# Patient Record
Sex: FEMALE | Race: OTHER | Employment: UNEMPLOYED | URBAN - METROPOLITAN AREA
[De-identification: names, ages, dates, MRNs, and addresses within clinical notes are randomized per-mention and may not be internally consistent; named-entity substitution may affect disease eponyms.]

---

## 2017-01-09 ENCOUNTER — APPOINTMENT (OUTPATIENT)
Dept: CT IMAGING | Age: 47
End: 2017-01-09
Payer: SELF-PAY

## 2017-01-09 ENCOUNTER — HOSPITAL ENCOUNTER (EMERGENCY)
Age: 47
Discharge: HOME | End: 2017-01-10
Attending: EMERGENCY MEDICINE
Payer: SELF-PAY

## 2017-01-09 DIAGNOSIS — D50.9 IRON DEFICIENCY ANEMIA, UNSPECIFIED IRON DEFICIENCY ANEMIA TYPE: ICD-10-CM

## 2017-01-09 DIAGNOSIS — I10 ESSENTIAL HYPERTENSION: ICD-10-CM

## 2017-01-09 DIAGNOSIS — N39.0 URINARY TRACT INFECTION WITHOUT HEMATURIA, SITE UNSPECIFIED: Primary | ICD-10-CM

## 2017-01-09 LAB
BASOPHILS # BLD AUTO: 0.1 K/UL (ref 0–0.2)
BASOPHILS # BLD: 1 % (ref 0–2)
DIFFERENTIAL METHOD BLD: ABNORMAL
EOSINOPHIL # BLD: 0.3 K/UL (ref 0–0.8)
EOSINOPHIL NFR BLD: 3 % (ref 0.5–7.8)
ERYTHROCYTE [DISTWIDTH] IN BLOOD BY AUTOMATED COUNT: 15 % (ref 11.9–14.6)
HCG UR QL: NEGATIVE
HCT VFR BLD AUTO: 36.5 % (ref 35.8–46.3)
HGB BLD-MCNC: 10.9 G/DL (ref 11.7–15.4)
IMM GRANULOCYTES # BLD: 0 K/UL (ref 0–0.5)
IMM GRANULOCYTES NFR BLD AUTO: 0.2 % (ref 0–5)
LYMPHOCYTES # BLD AUTO: 33 % (ref 13–44)
LYMPHOCYTES # BLD: 3.2 K/UL (ref 0.5–4.6)
MCH RBC QN AUTO: 23.7 PG (ref 26.1–32.9)
MCHC RBC AUTO-ENTMCNC: 29.9 G/DL (ref 31.4–35)
MCV RBC AUTO: 79.3 FL (ref 79.6–97.8)
MONOCYTES # BLD: 0.7 K/UL (ref 0.1–1.3)
MONOCYTES NFR BLD AUTO: 8 % (ref 4–12)
NEUTS SEG # BLD: 5.4 K/UL (ref 1.7–8.2)
NEUTS SEG NFR BLD AUTO: 55 % (ref 43–78)
PLATELET # BLD AUTO: 359 K/UL (ref 150–450)
PMV BLD AUTO: 11.2 FL (ref 10.8–14.1)
RBC # BLD AUTO: 4.6 M/UL (ref 4.05–5.25)
WBC # BLD AUTO: 9.8 K/UL (ref 4.3–11.1)

## 2017-01-09 PROCEDURE — 70450 CT HEAD/BRAIN W/O DYE: CPT

## 2017-01-09 PROCEDURE — 84443 ASSAY THYROID STIM HORMONE: CPT | Performed by: PHYSICIAN ASSISTANT

## 2017-01-09 PROCEDURE — 81015 MICROSCOPIC EXAM OF URINE: CPT | Performed by: PHYSICIAN ASSISTANT

## 2017-01-09 PROCEDURE — 85025 COMPLETE CBC W/AUTO DIFF WBC: CPT | Performed by: PHYSICIAN ASSISTANT

## 2017-01-09 PROCEDURE — 74011250636 HC RX REV CODE- 250/636: Performed by: PHYSICIAN ASSISTANT

## 2017-01-09 PROCEDURE — 80053 COMPREHEN METABOLIC PANEL: CPT | Performed by: PHYSICIAN ASSISTANT

## 2017-01-09 PROCEDURE — 93005 ELECTROCARDIOGRAM TRACING: CPT | Performed by: PHYSICIAN ASSISTANT

## 2017-01-09 PROCEDURE — 96361 HYDRATE IV INFUSION ADD-ON: CPT | Performed by: PHYSICIAN ASSISTANT

## 2017-01-09 PROCEDURE — 81003 URINALYSIS AUTO W/O SCOPE: CPT | Performed by: PHYSICIAN ASSISTANT

## 2017-01-09 PROCEDURE — 81025 URINE PREGNANCY TEST: CPT

## 2017-01-09 PROCEDURE — 99284 EMERGENCY DEPT VISIT MOD MDM: CPT | Performed by: PHYSICIAN ASSISTANT

## 2017-01-09 PROCEDURE — 96374 THER/PROPH/DIAG INJ IV PUSH: CPT | Performed by: PHYSICIAN ASSISTANT

## 2017-01-09 RX ADMIN — SODIUM CHLORIDE 1000 ML: 900 INJECTION, SOLUTION INTRAVENOUS at 23:25

## 2017-01-10 ENCOUNTER — APPOINTMENT (OUTPATIENT)
Dept: CT IMAGING | Age: 47
End: 2017-01-10
Payer: SELF-PAY

## 2017-01-10 VITALS
SYSTOLIC BLOOD PRESSURE: 125 MMHG | TEMPERATURE: 98.5 F | RESPIRATION RATE: 16 BRPM | OXYGEN SATURATION: 100 % | HEART RATE: 85 BPM | HEIGHT: 65 IN | WEIGHT: 170 LBS | DIASTOLIC BLOOD PRESSURE: 86 MMHG | BODY MASS INDEX: 28.32 KG/M2

## 2017-01-10 LAB
ALBUMIN SERPL BCP-MCNC: 3.8 G/DL (ref 3.5–5)
ALBUMIN/GLOB SERPL: 0.9 {RATIO} (ref 1.2–3.5)
ALP SERPL-CCNC: 81 U/L (ref 50–136)
ALT SERPL-CCNC: 17 U/L (ref 12–65)
ANION GAP BLD CALC-SCNC: 6 MMOL/L (ref 7–16)
AST SERPL W P-5'-P-CCNC: 12 U/L (ref 15–37)
ATRIAL RATE: 78 BPM
BACTERIA URNS QL MICRO: ABNORMAL /HPF
BILIRUB SERPL-MCNC: 0.3 MG/DL (ref 0.2–1.1)
BUN SERPL-MCNC: 12 MG/DL (ref 6–23)
CALCIUM SERPL-MCNC: 8.3 MG/DL (ref 8.3–10.4)
CALCULATED P AXIS, ECG09: 73 DEGREES
CALCULATED R AXIS, ECG10: 63 DEGREES
CALCULATED T AXIS, ECG11: 60 DEGREES
CASTS URNS QL MICRO: 0 /LPF
CHLORIDE SERPL-SCNC: 105 MMOL/L (ref 98–107)
CO2 SERPL-SCNC: 29 MMOL/L (ref 21–32)
CREAT SERPL-MCNC: 0.86 MG/DL (ref 0.6–1)
CRYSTALS URNS QL MICRO: ABNORMAL /LPF
DIAGNOSIS, 93000: NORMAL
DIASTOLIC BP, ECG02: NORMAL MMHG
EPI CELLS #/AREA URNS HPF: ABNORMAL /HPF
FLUAV AG NPH QL IA: NEGATIVE
FLUBV AG NPH QL IA: NEGATIVE
GLOBULIN SER CALC-MCNC: 4.2 G/DL (ref 2.3–3.5)
GLUCOSE SERPL-MCNC: 126 MG/DL (ref 65–100)
MUCOUS THREADS URNS QL MICRO: ABNORMAL /LPF
P-R INTERVAL, ECG05: 156 MS
POTASSIUM SERPL-SCNC: 3.8 MMOL/L (ref 3.5–5.1)
PROT SERPL-MCNC: 8 G/DL (ref 6.3–8.2)
Q-T INTERVAL, ECG07: 384 MS
QRS DURATION, ECG06: 76 MS
QTC CALCULATION (BEZET), ECG08: 437 MS
RBC #/AREA URNS HPF: 0 /HPF
SODIUM SERPL-SCNC: 140 MMOL/L (ref 136–145)
SYSTOLIC BP, ECG01: NORMAL MMHG
TSH SERPL DL<=0.005 MIU/L-ACNC: 1.29 UIU/ML (ref 0.36–3.74)
VENTRICULAR RATE, ECG03: 78 BPM
WBC URNS QL MICRO: ABNORMAL /HPF

## 2017-01-10 PROCEDURE — 70496 CT ANGIOGRAPHY HEAD: CPT

## 2017-01-10 PROCEDURE — 74011636320 HC RX REV CODE- 636/320: Performed by: EMERGENCY MEDICINE

## 2017-01-10 PROCEDURE — 74011000258 HC RX REV CODE- 258: Performed by: EMERGENCY MEDICINE

## 2017-01-10 PROCEDURE — 87804 INFLUENZA ASSAY W/OPTIC: CPT | Performed by: PHYSICIAN ASSISTANT

## 2017-01-10 PROCEDURE — 74011250636 HC RX REV CODE- 250/636: Performed by: PHYSICIAN ASSISTANT

## 2017-01-10 RX ORDER — NITROFURANTOIN 25; 75 MG/1; MG/1
100 CAPSULE ORAL 2 TIMES DAILY
Qty: 10 CAP | Refills: 0 | Status: SHIPPED | OUTPATIENT
Start: 2017-01-10 | End: 2017-01-15

## 2017-01-10 RX ORDER — SODIUM CHLORIDE 0.9 % (FLUSH) 0.9 %
10 SYRINGE (ML) INJECTION
Status: COMPLETED | OUTPATIENT
Start: 2017-01-10 | End: 2017-01-10

## 2017-01-10 RX ORDER — LORAZEPAM 2 MG/ML
1 INJECTION INTRAMUSCULAR
Status: COMPLETED | OUTPATIENT
Start: 2017-01-10 | End: 2017-01-10

## 2017-01-10 RX ADMIN — LORAZEPAM 1 MG: 2 INJECTION, SOLUTION INTRAMUSCULAR; INTRAVENOUS at 00:02

## 2017-01-10 RX ADMIN — Medication 10 ML: at 02:41

## 2017-01-10 RX ADMIN — SODIUM CHLORIDE 100 ML: 900 INJECTION, SOLUTION INTRAVENOUS at 02:41

## 2017-01-10 RX ADMIN — IOPAMIDOL 100 ML: 755 INJECTION, SOLUTION INTRAVENOUS at 02:41

## 2017-01-10 NOTE — DISCHARGE INSTRUCTIONS
You are slightly anemic and this will need to be followed up with your primary care provider at the [facility]. If you should have any change in your symptoms, worsening symptoms, or concerns return to the ER    It is very important that you follow up with your Doctor at the [facility] to be treated for your hypertension. Anemia por deficiencia de neelam: Instrucciones de cuidado - [ Iron Deficiency Anemia: Care Instructions ]  Instrucciones de cuidado    Tener anemia significa que usted no tiene suficientes glóbulos rojos. Los glóbulos rojos transportan oxígeno por el cuerpo. Si tiene anemia, esta puede hacer que se fatmata pálido y que esté débil y cansado. Muchas cosas pueden causar anemia. La causa más común es la pérdida de [place]. [place] puede ocurrir si tiene períodos [employer]. También puede suceder si tiene algún sangrado (hemorragia) en el estómago o los intestinos. También puede tener anemia si no obtiene suficiente neelam en lee dieta o si lee cuerpo tiene dificultades para absorber el neelam. En algunos casos, el embarazo produce anemia. [place] es porque charis carlito embarazada necesita más neelam. Lee médico aliyah vez le lily más pruebas para encontrar la causa de lee anemia. Si charis enfermedad u otro problema de [facility] causándola, lee médico tratará sagar problema. Es importante que siga viendo a lee médico para asegurarse de que lee nivel de neelam vuelva a la normalidad. La atención de seguimiento es charis parte clave de lee tratamiento y seguridad. Asegúrese de hacer y acudir a todas las citas, y llame a lee médico si está teniendo problemas. También es charis buena idea saber los resultados de los exámenes y mantener charis lista de los medicamentos que eulalia. ¿Cómo puede cuidarse en el hogar? · Si lee médico le recetó pastillas de neelam, tómelas según las indicaciones. ¨ Trate de tomarlas con el estómago vacío.  Puede hacer esto aproximadamente 1 hora antes de comer o 2 horas después de comer. Sasha podría necesitar lucero el neelam con la comida para evitar el malestar estomacal.  ¨ No tome antiácidos, leche ni nada con cafeína dentro de las 2 horas de lucero lee neelam. Esos productos pueden impedir que el cuerpo absorba jen el neelam. ¨ La vitamina C ayuda a lee organismo a absorber el neelam. Sería conveniente lucero las pastillas de neelam con un vaso de jugo de naranja o con otro tipo de alimento rico en vitamina C.  ¨ Las pastillas de neelam podrían causarle problemas estomacales. Estos incluyen [employer]V, náuseas, diarrea, estreñimiento y retortijones. Puede ayudarle lucero bastante cantidad de líquidos e incluir frutas, verduras y [place] en lee alimentación. ¨ Es normal que las pastilla de neelam urvashi que shivani heces sruthi de color verdoso o grisáceo negruzco. Sasha el sangrado interno también puede producir heces oscuras. De modo que es importante que le avise a lee médico acerca de cualquier cambio de color. ¨ Llame a lee médico si desiree estar teniendo problemas con las pastillas de neelam. Incluso después de que empiece a sentirse mejor, lee cuerpo tardará varios meses en acumular Mariam Ruse de neelam. ¨ Si olvida lucero [employer], no tome charis dosis doble la siguiente vez. ¨ Mantenga las pastillas de neelam fuera del alcance de los niños pequeños. Demasiado neelam puede ser [employer]. · Coma alimentos con mucho neelam. Estos incluyen ana joyce, mariscos, aves y [place]. Revonda Elder frijoles (habichuelas), uvas pasas, pan de grano integral y verduras de [employer]. · Prepare las verduras al vapor. Esta es la mejor manera de prepararlas si quiere obtener la mayor cantidad de neelam posible. · Sea christo con los medicamentos. No tome analgésicos (medicamentos para el dolor) antiinflamatorios no esteroideos a menos que el médico se lo indique. Estos incluyen aspirina, naproxeno (Aleve) e ibuprofeno (Advil, Motrin).   · Las formas líquidas de neelam pueden manchar tana dientes. Sasha usted puede mezclarlo con agua o jugo y beberlo con charis pajita (popote). [employer] modo, no se adherirá a tana dientes. ¿Cuándo debe pedir ayuda? Llame al 911 en cualquier momento que considere que necesita atención de [place]. Por ejemplo, llame si:  · Se desmayó (perdió el conocimiento). · Vomita lola o algo parecido a granos de café molido. · Las heces son de color rojizo o muy sanguinolentas (con lola). Llame a lee médico ahora mismo o busque atención médica inmediata si:  · Tana heces son negruzcas y parecidas al alquitrán o tienen rastros de [place]. · Siente mareos o aturdimiento, o que está a punto de [place]. Preste especial atención a los cambios en lee jelly y asegúrese de comunicarse con lee médico si:  · La fatiga y la debilidad continúan o [place]. · Tiene efectos secundarios después de lucero pastillas de neelam, negrito náuseas, vómito, estreñimiento, diarrea o acidez estomacal.  · No mejora negrito se esperaba. ¿Dónde puede encontrar más información en inglés? Cassfabio Rankin a http://yasmin-carly.info/. Cata Marlow O035 en la búsqueda para aprender más acerca de \"Anemia por deficiencia de neelam: Instrucciones de cuidado - [ Iron Deficiency Anemia: Care Instructions ]. \"  Revisado: 5 febrero, 2016  Versión del contenido: 11.1  © 6254-2082 [employer], [employer]. Las instrucciones de cuidado fueron adaptadas bajo licencia por Good Help Connections (which disclaims liability or warranty for this information). Si usted tiene [place] afección médica o sobre estas instrucciones, siempre pregunte a lee profesional de jelly. [facility] niega toda garantía o responsabilidad por lee uso de esta información. Presión arterial andrés: Instrucciones de cuidado - [ High Blood Pressure: Care Instructions ]  Instrucciones de cuidado  Si lee presión arterial suele ser superior a 140/90, usted tiene presión arterial andrés o hipertensión.  [place] significa que el número de [place] es 140 o superior o que el número de abajo es 90 o superior, o ambas cosas. A pesar de lo que mucha gente desiree, la hipertensión no suele causar dolor de michelle ni provocar mareos o aturdimiento. Generalmente, no presenta síntomas. Sin embargo, aumenta el riesgo de ataque al corazón, ataque cerebral, y daños en los riñones o en los ojos. A mayor presión arterial, mayor riesgo. Lee médico le dará charis meta para lee presión arterial. Lee meta se basará en lee jelly y lee edad. Un ejemplo de meta es mantener lee presión arterial por debajo de 140/90. Los cambios de estilo de davonte, negrito alimentarse en forma saludable y [place], siempre son importantes para ayudarle a bajar la presión arterial. También podría lucero medicamentos para lograr lee meta para la presión arterial.  La atención de seguimiento es charis parte clave de lee tratamiento y seguridad. Asegúrese de hacer y acudir a todas las citas, y llame a lee médico si está teniendo problemas. También es charis buena idea saber los resultados de shivani exámenes y mantener charis lista de los medicamentos que eulalia. ¿Cómo puede cuidarse en el hogar? Tratamiento médico  · Si fatoumata de lucero shivani medicamentos, lee presión arterial volverá a subir. Es posible que deba lucero un tipo de [place], o más de [place], para reducir la presión arterial. Sea christo con los medicamentos. [place] lee medicamento exactamente negrito se lo hayan indicado. Llame a lee médico si desiree estar teniendo problemas con lee medicamento. · Hable con lee médico antes de empezar a lucero [facility]. La aspirina puede ayudar a determinadas personas a reducir lee riesgo de tener un ataque cardíaco o un ataque cerebral. Sasha lucero aspirina no es adecuado para todo el TRENGEREID, debido a que puede causar sangrado grave. · Visite a lee médico periódicamente.  Usted podría necesitar consultar a lee médico con más frecuencia al principio o hasta que se reduzca lee presión arterial.  · Si usted está tomando medicamentos para la presión arterial, hable con lee médico antes de lucero medicamentos descongestionantes o antiinflamatorios, negrito ibuprofeno. Algunos de estos medicamentos pueden elevar la presión arterial.  · Aprenda a revisarse la presión arterial en lee hogar. Cambios en el estilo de davonte  · Mantenga un peso saludable. [employer] es particularmente importante si aumenta de peso en la zhen de la cintura. Bajar solo 10 libras (4.5 kg) puede ayudarle a reducir lee presión arterial.  · Juliet más ejercicios si lee médico se lo recomienda. Caminar es charis buena opción. Poco a poco, aumente la distancia que [employer]. Intente hacer por lo menos 30 minutos de ejercicio la mayoría de los días de la [place]. También podría nadar, montar en bicicleta o hacer otras actividades. · No tome alcohol o limite la cantidad que suhas. Hable con lee médico acerca de si puede lucero alcohol. · Trate de limitar la cantidad de sodio que consume a menos de 2,300 miligramos (mg) al día. Lee médico podría pedirle que trate de consumir menos de 1,500 mg al día. · Coma abundantes frutas (negrito bananas [plátanos] y naranjas), verduras, legumbres, granos integrales y productos lácteos de bajo contenido de [place]. · Reduzca la cantidad de grasas saturadas en lee dieta. Los productos derivados de los [place], negrito la [place], el queso y la carne, contienen grasas saturadas. El limitar estos alimentos podría ayudarle a bajar de peso y [place] reducir lee riesgo de charis enfermedad cardíaca. · No fume. El hábito de fumar aumenta lee riesgo de ataque cerebral y ataque al corazón. Si necesita ayuda para dejar de fumar, hable con lee médico sobre programas y medicamentos para dejar de fumar. Estos pueden aumentar shivani probabilidades de dejar el hábito para siempre. ¿Cuándo debe pedir ayuda? Llame al 911 en cualquier momento que considere que necesita atención de [place].  [employer] puede significar que tiene síntomas que sugieren que lee presión arterial le está causando un problema cardíaco o vascular grave. Lee presión arterial podría ser superior a 180/110. Por ejemplo, llame al 911 si:  · Tiene síntomas de un ataque al corazón. Estos pueden incluir:  ¨ Dolor o presión en el pecho, o charis sensación extraña en el pecho. ¨ Sudoración. ¨ Falta de aire. ¨ Náuseas o vómito. ¨ Dolor, presión o charis sensación extraña en la espalda, el julius, la mandíbula, la parte superior del abdomen o en lore o ambos hombros o brazos. ¨ Aturdimiento o debilidad repentina. ¨ Latidos del corazón rápidos o irregulares. · Tiene síntomas de un ataque cerebral. Estos pueden incluir:  ¨ Entumecimiento, hormigueo, debilidad o parálisis repentinos en la don, el brazo o la pierna, sobre todo en un solo lado del cuerpo. ¨ Cambios repentinos en la visión. ¨ Dificultades repentinas para hablar. ¨ Confusión repentina o dificultad súbita para comprender frases sencillas. ¨ Problemas repentinos para caminar o mantener el equilibrio. ¨ Dolor de [place] intenso y repentino, distinto de los daniel de michelle anteriores. · Tiene un dolor intenso en la espalda o el abdomen. No espere a que la presión arterial baje por sí iliana. Obtenga ayuda de inmediato. Llame a lee médico ahora mismo o busque atención de inmediato si:  · Tiene la presión arterial mucho más andrés de lo normal (negrito 180/110 o más andrés), evans no tiene síntomas. · Piensa que la presión arterial andrés le está provocando síntomas, negrito:  ¨ Dolor de michelle intenso. [address]. Vigile de cerca los [place] Mo[place], y asegúrese de comunicarse con lee médico si:  · Lee presión arterial mide 140/90 o más en al menos 2 ocasiones. [place] significa que el número de [place] es 140 o superior o el número de abajo es 90 o superior, o ambas cosas.   · Celia que podría estar teniendo efectos secundarios de los medicamentos para la presión arterial.  · Lee presión arterial suele ser normal, evans se eleva por encima de lo normal en al menos 2 ocasiones. ¿Dónde puede encontrar más información en inglés? Geetha Troncoso a http://yasmin-carly.info/. Rehan Booth Y147 en la búsqueda para aprender más acerca de \"Presión arterial andrés: Instrucciones de cuidado - [ High Blood Pressure: Care Instructions ]. \"  Revisado: 8 agosto, 2016  Versión del contenido: 11.1  © 4884-7565 Healthwise, Incorporated. Las instrucciones de cuidado fueron adaptadas bajo licencia por [employer] (which disclaims liability or warranty for this information). Si usted tiene [place] afección médica o sobre estas instrucciones, siempre pregunte a lee profesional de jelly. Healthwise, Incorporated niega toda garantía o responsabilidad por lee uso de esta información. Dieta DASH: Instrucciones de cuidado - [ DASH Diet: Care Instructions ]  Instrucciones de cuidado  La dieta DASH es un plan de alimentación que puede ayudar a bajar la presión arterial. DASH es la sigla en inglés de \"Dietary Approaches to Stop Hypertension\" (medidas dietéticas para disminuir la hipertensión). Hipertensión significa presión arterial andrés. La dieta DASH se concentra en el consumo de alimentos con alto contenido de calcio, potasio y [place]. Estos nutrientes pueden disminuir la presión arterial. Los alimentos con el mayor contenido de [place] nutrientes son las frutas, las verduras, los productos lácteos de bajo contenido de [place], las nueces, las semillas y las legumbres. Sasha lucero suplementos de calcio, potasio y magnesio, en lugar de comer alimentos ricos en estos nutrientes, no tiene el mismo efecto. La dieta DASH también incluye granos integrales, pescado y aves. La dieta DASH es lore de los cambios de estilo de davonte que quizá le recomiende lee médico para reducir la presión arterial andrés. Es posible que lee médico también quiera que usted reduzca la cantidad de sodio en lee Cathlean Jose.  Reducir el sodio mientras sigue la dieta DASH puede bajar la presión arterial incluso más que únicamente con la dieta DASH. La atención de seguimiento es charis parte clave de lee tratamiento y seguridad. Asegúrese de hacer y acudir a todas las citas, y llame a lee médico si está teniendo problemas. También es charis buena idea saber los resultados de shivani exámenes y mantener charis lista de los medicamentos que eulalia. ¿Cómo puede cuidarse en el hogar? Cómo seguir la dieta DASH  · Coma entre 4 y 5 porciones de fruta al día. Charis porción incluye 1 fruta de [place], ½ taza de fruta enlatada o cortada en trozos, 1/4 taza de fruta seca o 4 onzas (½ taza) de jugo de frutas. Elija fruta con más frecuencia que jugo. · Consuma entre 4 y 11 porciones de verduras al día. Charis porción incluye 1 taza de [place] o de verduras de hojas crudas, ½ taza de verduras cocidas o cortadas en trozos, o 4 onzas (½ taza) de jugo de verduras. Elija comer verduras con más frecuencia que lucero lee jugo. · Consuma entre 2 y 3 porciones de lácteos semidescremados y descremados al día. Charis porción incluye 8 onzas de [place], 1 taza de yogur o 1½ onzas de Sandra-barre. · Coma entre 6 y 6 porciones de granos todos los [address] Charis porción incluye 1 rebanada de pan, 1 onza de cereal seco, o ½ taza de arroz, pasta o cereal cocido. Trate de elegir productos de grano integral con la mayor frecuencia posible. · Limite la cantidad de [place]), aves y pescado a 2 porciones al día. Yazan Carmen porción son 3 onzas, lo que es aproximadamente el tamaño de un mat de naipes. · Coma entre 4 y 5 porciones de nueces, semillas y legumbres (frijoles [habichuelas], lentejas y arvejas [chícharos] secos y cocidos) a la semana. Charis porción incluye 1/3 taza de nueces, 2 cucharadas de semillas o ½ taza de frijoles o arvejas cocidos. · [address] [facility] y aceites a 2 o 3 porciones al día. Charis porción es 1 cucharadita de aceite vegetal o 2 cucharadas de aderezo para ensaladas. · Limite los dulces y el azúcar adicional a 5 porciones o menos por semana.  Yazan Carmen porción es 1 cucharada de [place] o [place], ½ taza de sorbete o 1 taza de limonada. · Consuma menos de 2,300 miligramos (mg) de sodio al día. Si limita lee consumo de sodio a 1,500 mg al día, puede reducir lee presión arterial aún más. Consejos para tener éxito  · Inicie con cambios pequeños. No intente hacer cambios radicales en lee dieta de manera repentina. Podría sentir que extraña shivani comidas favoritas y, por lo [place], bri charis mayor probabilidad de que no cumpla el plan. Kandee Emperatriz y Darrius. Sandra Chu que esos cambios se conviertan en un hábito, incorpore algunos [employer]. · Pruebe algo de lo siguiente:  ¨ Fíjese el objetivo de comer charis porción de fruta o de verduras en todas las comidas y los refrigerios. Grace facilitará alcanzar la cantidad diaria recomendada de frutas y verduras. ¨ Pruebe comer yogur cubierto con frutas frescas y nueces negrito refrigerio o negrito postre saludable. ¨ Agregue franca, tomate, pepino y cebolla a shivani sándwiches. ¨ Combine charis masa de pizza precocida con queso mozzarella de bajo contenido de grasa (\"low-fat\") y cúbralo con abundantes verduras. Intente utilizar tomates, calabaza, espinaca, brócoli, zanahorias, coliflor y cebollas. ¨ Opte por comer charis variedad de verduras cortadas en trozos con un aderezo de bajo contenido de grasa negrito refrigerio, en lugar de \"chips\" (tipo ollie fritas) y un \"dip\" (producto untable). ¨ [place] semillas de girasol o almendras picadas [employer]. O intente agregar nueces o almendras picadas a las verduras cocidas. ¨ Pruebe algunas comidas vegetarianas con frijoles y arvejas. Rushie Hews o frijoles rojos a las ensaladas. Prepare burritos y tacos con puré de frijoles pintos o negros. ¿Dónde puede encontrar más información en inglés? Pb Sandra a http://yasmin-carly.info/.   Glorya Sacks [id number] en la búsqueda para aprender más acerca de \"Dieta DASH: Instrucciones de cuidado - [ DASH Diet: Care Instructions ]. \"  Revisado: 23 marzo, 2016  Versión del contenido: 11.1  © 9161-4669 Healthwise, Incorporated. Las instrucciones de cuidado fueron adaptadas bajo licencia por Good Help Connections (which disclaims liability or warranty for this information). Si usted tiene [place] afección médica o sobre estas instrucciones, siempre pregunte a lee profesional de jelly. Healthwise, Incorporated niega toda garantía o responsabilidad por lee uso de esta información. Aprenda sobre la presión arterial andrés - [ Learning About High Blood Pressure ]  ¿Qué es la presión arterial andrés? La presión arterial es charis medida de la fuerza que ejerce la lola contra las craig de las arterias. Es normal que la presión arterial suba y baje a lo monet del día, sasha si se mantiene andrés, usted tiene la presión arterial andrés. Otro nombre para la presión arterial andrés es hipertensión. Dos números le indican lee presión arterial. El primer número indica la presión sistólica. Esta muestra qué tan marie presiona la lola cuando el corazón está bombeando. El barbara número indica la presión diastólica. Esta muestra qué tan marie presiona la [employer] latidos, cuando el corazón está relajado y se está llenando de [place]. Charis presión arterial de menos de 120/80 (se dice \"120 sobre 80\") es ideal para un adulto. La presión arterial andrés es de 140/90 o superior. Usted tiene la presión arterial andrés si el número de [place] es 140 o superior o el número de abajo es 90 o superior, o ambas cosas. Muchas personas caen en la categoría de en medio, denominada prehipertensión. Las personas con prehipertensión necesitan hacer cambios en lee estilo de davonte para bajar la presión arterial y ayudar a prevenir o a retrasar la presión arterial andrés. ¿Qué ocurre cuando tiene presión arterial andrés? · La lola fluye por las arterias con Breana Golden. Con el tiempo, esto daña las craig de las arterias.  Sasha usted no puede sentirlo. La presión arterial andrés generalmente no causa síntomas. · La grasa y el calcio empiezan a acumularse en las arterias. Esta acumulación se llama placa. La placa hace que las arterias sruthi más estrechas y [place]. La lola no puede fluir a través de ellas tan fácilmente. · Esta falta de un buen flujo de lola empieza a dañar [employer] de lee cuerpo. [place] puede conducir a problemas negrito la enfermedad de las arterias coronarias y un ataque cardíaco, insuficiencia cardíaca, ataque cerebral, insuficiencia renal y [de-identified] a los ojos. ¿Cómo puede prevenir la presión arterial andrés? · Mantenga un peso saludable. · Trate de limitar la cantidad de sodio que consume a menos de 2,300 miligramos (mg) al día. Si limita lee consumo de sodio a 1,500 mg al día, puede reducir lee presión arterial aún más. ¨ Compre alimentos José Miguel Brownie diga \"sin sal\" (\"unsalted\"), \"ozzy de sodio\" (\"sodium-free\") o \"bajo en sodio\" (\"low-sodium\"). Los alimentos que indiquen en la etiqueta \"reducido en sodio\" (\"reduced-sodium\") y \"poco sodio\" (\"light sodium\") aún pueden contener demasiado sodio. ¨ Sazone shivani comidas con ajo, jugo de caren, cebolla, vinagre, hierbas y especias en lugar de sal. No use salsa de soya, salsa para ana, sal de cebolla, sal de ajo, mostaza ni ketchup (salsa de tomate) en shivani alimentos. ¨ Use menos sal (o nada) de lo que indique la receta. Por lo general, puede añadir la mitad de la cantidad de cr que pide la receta sin que la comida pierda el sabor. · Manténgase activo físicamente. Juliet por lo menos 30 minutos de ejercicio la mayoría de los días de la [place]. Caminar es charis buena opción. Candiss Bacca desee hacer otras actividades, negrito correr, nadar, [employer], jugar al tenis o practicar otros deportes de equipo. · Limite el alcohol a 2 bebidas al día para los hombres y 1 bebida al día para las mujeres. · Coma muchas frutas, verduras y productos lácteos bajos en grasa.  Coma menos grasas saturadas y grasas totales. ¿Cómo se trata la presión arterial andrés? · Lee médico le sugerirá hacer cambios en el estilo de davonte. Por ejemplo, es posible que lee médico le pida que coma alimentos saludables, que deje de fumar, que baje de peso y que sea [place]. · Si los cambios de estilo de davonte no ayudan lo suficiente o lee presión arterial es muy andrés, tendrá que lucero [employer] La atención de seguimiento es charis parte clave de lee tratamiento y seguridad. Asegúrese de hacer y acudir a todas las citas, y llame a lee médico si está teniendo problemas. También es charis buena idea saber los resultados de shivani exámenes y mantener charis lista de los medicamentos que eulalia. ¿Dónde puede encontrar más información en inglés? [place] Jose Juan a http://yasmin-carly.info/. Escriba P501 en la búsqueda para aprender más acerca de \"Aprenda sobre la presión arterial andrés - [ Learning About High Blood Pressure ]. \"  Revisado: 23 marzo, 2016  Versión del contenido: 11.1  © 6360-7293 Healthwise, Incorporated. Las instrucciones de cuidado fueron adaptadas bajo licencia por [employer] (which disclaims liability or warranty for this information). Si usted tiene [place] afección médica o sobre estas instrucciones, siempre pregunte a lee profesional de jelly. Healthwise, Incorporated niega toda garantía o responsabilidad por lee uso de esta información. Dieta baja en sodio (2,000 miligramos): Instrucciones de cuidado - [ Low Sodium Diet (2,000 Milligram): Care Instructions ]  Instrucciones de cuidado  Demasiado sodio hace que el organismo retenga agua en exceso. [place] puede aumentar la presión arterial y obligar al corazón y a los riñones a trabajar [place]. En casos muy graves, podrían tener que hospitalizarlo. Hasta podría poner en peligro la davonte. Si limita el consumo de sodio, se sentirá mejor y reducirá lee riesgo de tener problemas graves.   La jose más común de [place] es la cr. Las personas obtienen la mayor parte de la sal en lee dieta de los alimentos enlatados, preparados y de paquete. La comida rápida y los platillos de restaurante también tienen niveles muy altos de [place]. Es probable que lee médico le limite el sodio a menos de 2,000 miligramos (mg) al día. Sandi límite tiene en cuenta todo el sodio presente en los alimentos preparados y de [employer], y toda la sal que añada a shivani alimentos. La atención de seguimiento es charis parte clave de lee tratamiento y seguridad. Asegúrese de hacer y acudir a todas las citas, y llame a lee médico si está teniendo problemas. También es charis buena idea saber los resultados de shivani exámenes y mantener charis lista de los medicamentos que eulalia. ¿Cómo puede cuidarse en el hogar? Kait las etiquetas de los alimentos  · Kait las etiquetas de las latas y los alimentos de paquete. [employer] qué cantidad de sodio (\"sodium\") hay en cada porción. Asegúrese de fijarse en el tamaño de la porción. Si usted come Alicia, [employer] porción, consumirá dELiAs. · Las etiquetas de los alimentos también indican el Porcentaje del Valor Diario (\"Percent Daily Value\") recomendado para el sodio. Escoja productos con un bajo Porcentaje del Valor Diario de [place]. · Tenga en cuenta que el sodio puede venir en otras formas además de la sal, entre las que se incluyen el glutamato monosódico (MSG, por shivani siglas en inglés), el citrato de sodio y el bicarbonato de [place]. La comida asiática frecuentemente contiene MSG añadido. Si sale a comer, a veces puede pedir que no le pongan MSG ni sal a shivani alimentos. Compre alimentos bajos en sodio  · Compre alimentos que indiquen en la etiqueta \"sin sal\" (\"unsalted\") (sin sal añadida), \"sin sodio\" (\"sodium-free\") (menos de 5 mg de sodio por porción) o \"bajo en sodio\" (\"low-sodium\") (menos de 140 mg de sodio por porción).  Los alimentos que indiquen en la etiqueta \"reducido en sodio\" (\"reduced-sodium\") y \"ligero contenido de sodio\" (\"light sodium\") aún pueden contener [employer]. Asegúrese de leer la etiqueta para verificar cuánto sodio está consumiendo. · Compre verduras frescas o congeladas que no tengan salsas [id number]. Compre las versiones de bajo sodio de verduras [place], sopas y otros productos enlatados. Prepare comidas bajas en sodio  · Reduzca la cantidad de sal que [place] para cocinar. [place] le ayudará a acostumbrarse al [employer]. No añada cr después de bri cocinado. Charis cucharadita de sal contiene alrededor de 2,300 mg de sodio. · Retire el salero de la rodriguez. · Sazone shivani comidas con ajo, jugo de caren, cebolla, vinagre, hierbas y especias. No use salsa de soya, salsa de soya \"lite\", salsa para familia, sal de cebolla, sal de ajo o de apio, mostaza ni ketchup (salsa de tomate) en shivani comidas. · Use aderezos para ensaladas, salsas y ketchup de bajo contenido de [place]. O prepare shivani propios aderezos para ensaladas y salsas sin añadir sal.  · Use menos sal (o nada) cuando la receta lo pida. Por lo general puede añadir la mitad de la cantidad de cr que pide la receta sin que esta pierda el sabor. Otros alimentos negrito el arroz, las pastas y los cereales no necesitan sal adicional.  · Enjuague las verduras enlatadas y cocínelas en agua fresca. [place] le michael algo de sal, evans no toda. · Evite el agua que naturalmente tenga un alto contenido de sodio o que haya sido tratada con ablandadores, los cuales [place]. Llame a lee compañía de agua local para averiguar cuál es el contenido de sodio del agua. Si compra agua embotellada, guillermina la etiqueta y escoja charis richar sin sodio. Evite los alimentos altos en sodio  · Evite comer:  ¨ Familia, pescados y aves [place], curados, salados y [place]. ¨ Jamón, tocino, perros calientes (\"hot dogs\") y familia frías. ¨ Queso común, radha y procesado y mantequilla de cacahuate (maní) común.   ¨ Galletas saladas y otros refrigerios salados negrito \"pretzels\", \"chips\" (negrito ollie fritas) y palomitas de [employer]. ¨ Comidas preparadas y congeladas, a menos que tengan charis etiqueta que diga \"bajo en sodio\" (\"low-sodium\"). ¨ Sopas, caldos y consomés enlatados y secos o deshidratados, a menos que digan \"sin sodio\" (\"sodium-free\") o \"bajo en sodio\" (\"low-sodium\"). ¨ Verduras enlatadas, a menos que digan \"sin sodio\" (\"sodium-free\") o \"bajo en sodio\" (\"low-sodium\"). ¨ Octavio fritas (a la francesa), pizzas, tacos y otras comidas rápidas. ¨ Pepinillos, aceitunas, ketchup y otros condimentos, en especial la salsa de soya, a menos que digan \"sin sodio\" (\"sodium-free\") o \"bajo en sodio\" (\"low-sodium\"). ¿Dónde puede encontrar más información en inglés? Shawn Nicolle a http://yasmin-carly.info/. [place] T889 en la búsqueda para aprender más acerca de \"Dieta baja en sodio (2,000 miligramos): Instrucciones de cuidado - [ Low Sodium Diet (2,000 Milligram): Care Instructions ]. \"  Revisado: 26 julio, 2016  Versión del contenido: 11.1  © 7858-1818 Healthwise, Incorporated. Las instrucciones de cuidado fueron adaptadas bajo licencia por [employer] (which disclaims liability or warranty for this information). Si usted tiene [place] afección médica o sobre estas instrucciones, siempre pregunte a lee profesional de jelly. Healthwise, Incorporated niega toda garantía o responsabilidad por lee uso de esta información. Infección urinaria en las mujeres: Instrucciones de cuidado - [ Urinary Tract Infection in Women: Care Instructions ]  Instrucciones de cuidado    Charis infección urinaria (UTI, por shivani siglas en inglés) es un término general que hace referencia a charis infección que se produce en cualquier parte entre los riñones y la uretra (conducto por el cual se expulsa la orina). La mayoría de las UTI son infecciones de la vejiga. Con frecuencia, causan dolor o ardor al [place]. Las UTI son causadas por bacterias y pueden curarse con antibióticos.  Asegúrese de completar el tratamiento para que la infección desaparezca. La atención de seguimiento es charis parte clave de lee tratamiento y seguridad. Asegúrese de hacer y acudir a todas las citas, y llame a lee médico si está teniendo problemas. También es charis buena idea saber los resultados de los exámenes y mantener charis lista de los medicamentos que eulalia. ¿Cómo puede cuidarse en el hogar? · [address] No deje de tomarlos por el hecho de sentirse mejor. Debe lucero todos los antibióticos hasta terminarlos. · Royce los próximos lore o [address], noelle mayor cantidad de [place] y otros líquidos. [place] puede ayudar a eliminar las bacterias que provocan la infección. (Si tiene charis enfermedad de los riñones, el corazón o el hígado y tiene que [place] líquidos, hable con lee médico antes de aumentar lee consumo). · Evite las bebidas gaseosas o con cafeína. Pueden irritar la vejiga. · Orine con frecuencia. Trate de vaciar la vejiga cada vez que orine. · Para aliviar el dolor, tome un baño caliente o colóquese charis almohadilla térmica a baja temperatura sobre la parte baja del abdomen o la zhen genital. Nunca se duerma mientras [place] charis almohadilla térmica. Para prevenir las infecciones urinarias  · Noelle abundante agua todos los días. [place] la ayuda a orinar con frecuencia, lo que elimina las bacterias de lee sistema. (Si tiene charis enfermedad de los riñones, el corazón o el hígado y tiene que [place] líquidos, hable con lee médico antes de aumentar lee consumo). · Considere añadir el jugo de arándanos (\"cranberry\") a lee dieta. · Orine cuando necesite hacerlo. · Orine inmediatamente después de bri tenido [employer]. · Cámbiese las toallas sanitarias con frecuencia. · Evite el uso de lavados vaginales, los tlaia de burbujas, los [place] de higiene femenina y otros productos para la higiene femenina que contengan desodorantes. · Después de ir al baño, límpiese de adelante hacia atrás.   ¿Cuándo debe pedir ayuda? Llame a lee médico ahora mismo o busque atención médica inmediata si:  · Aparecen síntomas negrito fiebre, escalofríos, náuseas o vómito por Marliss Lopez, o empeoran. · Tiene un nuevo dolor de espalda bre debajo de las [place] se llama dolor en el flanco.  · Tiene lola o pus nuevos en la orina. · Tiene problemas con lee antibiótico.  Preste especial atención a los cambios en lee jelly y asegúrese de comunicarse con lee médico si:  · No mejora después de bri tomado un antibiótico alessandra 2 días. · Tana síntomas desaparecen y [employer]. ¿Dónde puede encontrar más información en inglés? Kristal Manuelito a http://yasmin-carly.info/. Yakelin Arora S387 en la búsqueda para aprender más acerca de \"Infección urinaria en las mujeres: Instrucciones de cuidado - [ Urinary Tract Infection in Women: Care Instructions ]. \"  Revisado: 12 agosto, 2016  Versión del contenido: 11.1  © 6660-0882 Healthwise, Incorporated. Las instrucciones de cuidado fueron adaptadas bajo licencia por [employer] (which disclaims liability or warranty for this information). Si usted tiene [place] afección médica o sobre estas instrucciones, siempre pregunte a lee profesional de jelly. Healthwise, Incorporated niega toda garantía o responsabilidad por lee uso de esta información.

## 2017-01-10 NOTE — ED PROVIDER NOTES
HPI Comments: 39-year-old  male presents dizziness and headache with Low back pain that started this morning. Patient states that she was lying in bed and she stood up quickly sad block\". She states it lasted a few seconds and went away. She denies any syncope or near syncope and states she has not had this happen before. She states she also has an occipital headache as well as a frontal headache with chills that started this morning as well as low back pain. She denies any other recent illnesses. She reports that she was treated for hypertension in the past but has not been any blood pressure medicines for the last 3-4 years. She states she also has intermittent hematuria for quite some time. She denies fever but states that she has generalized body aches and muscle aches. She is status post ligation is  2 para 2 and states that she still has regular menstrual cycles. Patient is a 51 y.o. female presenting with headaches. The history is provided by the patient. The history is limited by a language barrier. A  was used. Headache    This is a new problem. The current episode started 6 to 12 hours ago. The problem occurs constantly. The problem has not changed since onset. The headache is aggravated by an unknown factor. The pain is located in the frontal and occipital region. The quality of the pain is described as dull. The pain is at a severity of 8/10. The pain is moderate. Associated symptoms include dizziness. Pertinent negatives include no fever, no chest pressure, no near-syncope, no palpitations, no syncope, no shortness of breath, no weakness, no visual change, no nausea and no vomiting. She has tried nothing for the symptoms. Past Medical History:   Diagnosis Date    HTN (hypertension) 2013    Hypertension      no meds    Missed ab      6wks    Other ill-defined conditions(799.89)      kidney stones       History reviewed.  No pertinent past surgical history. History reviewed. No pertinent family history. Social History     Social History    Marital status: SINGLE     Spouse name: N/A    Number of children: N/A    Years of education: N/A     Occupational History    Not on file. Social History Main Topics    Smoking status: Former Smoker    Smokeless tobacco: Never Used    Alcohol use No    Drug use: No    Sexual activity: No     Other Topics Concern    Not on file     Social History Narrative         ALLERGIES: Amoxicillin and Morphine    Review of Systems   Constitutional: Negative for activity change, appetite change, chills, diaphoresis, fatigue and fever. HENT: Negative for congestion, dental problem, ear pain, facial swelling, postnasal drip, rhinorrhea, sore throat and trouble swallowing. Eyes: Positive for photophobia. Negative for pain and visual disturbance. Respiratory: Negative for cough, chest tightness and shortness of breath. Cardiovascular: Negative for chest pain, palpitations, syncope and near-syncope. Gastrointestinal: Negative for abdominal pain, diarrhea, nausea and vomiting. Genitourinary: Negative. Musculoskeletal: Negative. Negative for arthralgias, myalgias, neck pain and neck stiffness. Skin: Negative. Neurological: Positive for dizziness and headaches. Negative for tremors, seizures, syncope, speech difficulty, weakness, light-headedness and numbness. Psychiatric/Behavioral: Negative. Negative for sleep disturbance. The patient is not nervous/anxious. All other systems reviewed and are negative. Vitals:    01/09/17 2025   BP: (!) 129/96   Pulse: 87   Resp: 17   Temp: 98.5 °F (36.9 °C)   SpO2: 100%   Weight: 77.1 kg (170 lb)   Height: 5' 5\" (1.651 m)            Physical Exam   Constitutional: She is oriented to person, place, and time. Vital signs are normal. She appears well-developed and well-nourished. She is active. Non-toxic appearance. She does not appear ill.  No distress. Patient is ambulatory about the exam room without any apparent difficulty. HENT:   Head: Normocephalic and atraumatic. Right Ear: Tympanic membrane normal.   Left Ear: Tympanic membrane normal.   Nose: Nose normal.   Mouth/Throat: Uvula is midline, oropharynx is clear and moist and mucous membranes are normal.   Eyes: Conjunctivae and EOM are normal. Pupils are equal, round, and reactive to light. No scleral icterus. Neck: Normal range of motion and full passive range of motion without pain. Neck supple. No muscular tenderness present. No Brudzinski's sign and no Kernig's sign noted. No thyromegaly present. Cardiovascular: Normal rate, regular rhythm, normal heart sounds and intact distal pulses. Exam reveals no gallop and no friction rub. No murmur heard. Pulmonary/Chest: Effort normal and breath sounds normal. No respiratory distress. She has no decreased breath sounds. She has no wheezes. She has no rhonchi. She has no rales. Abdominal: Soft. Normal appearance, normal aorta and bowel sounds are normal. She exhibits no distension, no abdominal bruit and no mass. There is no hepatosplenomegaly. There is no tenderness. Musculoskeletal: Normal range of motion. She exhibits no edema or tenderness. Lymphadenopathy:     She has no cervical adenopathy. Neurological: She is alert and oriented to person, place, and time. She has normal strength and normal reflexes. No sensory deficit. Coordination and gait normal.   No focal neurological deficits identified    Skin: Skin is warm, dry and intact. No cyanosis. Nails show no clubbing. Psychiatric: She has a normal mood and affect. Her speech is normal and behavior is normal.   Nursing note and vitals reviewed.        MDM  Number of Diagnoses or Management Options  Essential hypertension: new and requires workup  Iron deficiency anemia, unspecified iron deficiency anemia type: new and does not require workup  Urinary tract infection without hematuria, site unspecified: new and requires workup  Diagnosis management comments: Pt. Discussed with ED attending Dr. Leonardo Ahn as well as Dr. Segun Souza (Neurosurgery attending) who advised CT angiogram would better define possible abnormality on Head CT scan. This is discussed with patient via  and she agrees to plan. Patient is counseled regarding the risks and consequences of the controlled or uncontrolled hypertension. I have advised her that she is to follow up at the [facility] where she had been going tomorrow to schedule an appointment to have her blood pressure reevaluated. I also counseled patient regarding her mild microcytic anemia. I have advised her to begin taking iron supplements over-the-counter. I also advised her to further follow up with her primary care provider for this. Patient is prescribed Macrobid for her urinary tract infection. Patient states improvement following IV fluids in the ER.        Amount and/or Complexity of Data Reviewed  Clinical lab tests: ordered and reviewed  Tests in the radiology section of CPT®: ordered and reviewed  Discuss the patient with other providers: yes    Risk of Complications, Morbidity, and/or Mortality  Presenting problems: high  Diagnostic procedures: high  Management options: moderate    Patient Progress  Patient progress: stable    ED Course       Procedures      Recent Results (from the past 12 hour(s))   CBC WITH AUTOMATED DIFF    Collection Time: 01/09/17 11:39 PM   Result Value Ref Range    WBC 9.8 4.3 - 11.1 K/uL    RBC 4.60 4.05 - 5.25 M/uL    HGB 10.9 (L) 11.7 - 15.4 g/dL    HCT 36.5 35.8 - 46.3 %    MCV 79.3 (L) 79.6 - 97.8 FL    MCH 23.7 (L) 26.1 - 32.9 PG    MCHC 29.9 (L) 31.4 - 35.0 g/dL    RDW 15.0 (H) 11.9 - 14.6 %    PLATELET 176 187 - 892 K/uL    MPV 11.2 10.8 - 14.1 FL    DF AUTOMATED      NEUTROPHILS 55 43 - 78 %    LYMPHOCYTES 33 13 - 44 %    MONOCYTES 8 4.0 - 12.0 %    EOSINOPHILS 3 0.5 - 7.8 %    BASOPHILS 1 0.0 - 2.0 %    IMMATURE GRANULOCYTES 0.2 0.0 - 5.0 %    ABS. NEUTROPHILS 5.4 1.7 - 8.2 K/UL    ABS. LYMPHOCYTES 3.2 0.5 - 4.6 K/UL    ABS. MONOCYTES 0.7 0.1 - 1.3 K/UL    ABS. EOSINOPHILS 0.3 0.0 - 0.8 K/UL    ABS. BASOPHILS 0.1 0.0 - 0.2 K/UL    ABS. IMM. GRANS. 0.0 0.0 - 0.5 K/UL   METABOLIC PANEL, COMPREHENSIVE    Collection Time: 01/09/17 11:39 PM   Result Value Ref Range    Sodium 140 136 - 145 mmol/L    Potassium 3.8 3.5 - 5.1 mmol/L    Chloride 105 98 - 107 mmol/L    CO2 29 21 - 32 mmol/L    Anion gap 6 (L) 7 - 16 mmol/L    Glucose 126 (H) 65 - 100 mg/dL    BUN 12 6 - 23 MG/DL    Creatinine 0.86 0.6 - 1.0 MG/DL    GFR est AA >60 >60 ml/min/1.73m2    GFR est non-AA >60 >60 ml/min/1.73m2    Calcium 8.3 8.3 - 10.4 MG/DL    Bilirubin, total 0.3 0.2 - 1.1 MG/DL    ALT 17 12 - 65 U/L    AST 12 (L) 15 - 37 U/L    Alk. phosphatase 81 50 - 136 U/L    Protein, total 8.0 6.3 - 8.2 g/dL    Albumin 3.8 3.5 - 5.0 g/dL    Globulin 4.2 (H) 2.3 - 3.5 g/dL    A-G Ratio 0.9 (L) 1.2 - 3.5     TSH 3RD GENERATION    Collection Time: 01/09/17 11:39 PM   Result Value Ref Range    TSH 1.290 0.358 - 3.740 uIU/mL   URINE MICROSCOPIC    Collection Time: 01/09/17 11:39 PM   Result Value Ref Range    WBC 10-20 0 /hpf    RBC 0 0 /hpf    Epithelial cells 3-5 0 /hpf    Bacteria 2+ (H) 0 /hpf    Casts 0 0 /lpf    Crystals OCCASIONAL 0 /LPF    Mucus 1+ (H) 0 /lpf   HCG URINE, QL. - POC    Collection Time: 01/09/17 11:46 PM   Result Value Ref Range    Pregnancy test,urine (POC) NEGATIVE  NEG     INFLUENZA A & B AG (RAPID TEST)    Collection Time: 01/10/17 12:26 AM   Result Value Ref Range    Influenza A Ag NEGATIVE  NEG      Influenza B Ag NEGATIVE  NEG       CTA HEAD   Final Result   IMPRESSION:   No flow limiting stenosis or intracranial aneurysm.                CT HEAD WO CONT   Final Result   IMPRESSION:   Small hyperdensity overlying the left frontal cortex is felt to very likely be   volume averaging from adjacent frontal bone without definite acute intracranial   findings. If there remains clinical concern for a small intracranial hemorrhage   a short-term follow-up CT can be considered. This would be atypical in   appearance for hypertension related bleed. I discussed the results of all labs, procedures, radiographs, and treatments with the patient and available family. Treatment plan is agreed upon and the patient is ready for discharge. Questions about treatment in the ED and differential diagnosis of presenting condition were answered. Patient was given verbal discharge instructions including, but not limited to, importance of returning to the emergency department for any concern of worsening or continued symptoms. Instructions were given to follow up with a primary care provider or specialist within 1-2 days. Adverse effects of medications, if prescribed, were discussed and patient was advised to refrain from significant physical activity until followed up by primary care physician and to not drive or operate heavy machinery after taking any sedating substances.

## 2017-01-10 NOTE — ED TRIAGE NOTES
Pt states headache and \"seeing black\" since this morning. Pt states vision change is intermittent. Pt denies any N/V. Pt alert and oriented x 4. Respirations are even and unlabored. Pt appears in no acute distress at this time.

## 2017-01-10 NOTE — ED NOTES
I have reviewed discharge instructions with the patient. The patient verbalized understanding.  used.  Patient given script and instructions to lobby to wait for ride

## 2017-02-17 ENCOUNTER — HOSPITAL ENCOUNTER (EMERGENCY)
Age: 47
Discharge: HOME | End: 2017-02-17
Attending: EMERGENCY MEDICINE
Payer: SELF-PAY

## 2017-02-17 ENCOUNTER — APPOINTMENT (OUTPATIENT)
Dept: GENERAL RADIOLOGY | Age: 47
End: 2017-02-17
Attending: NURSE PRACTITIONER
Payer: SELF-PAY

## 2017-02-17 VITALS
HEIGHT: 65 IN | RESPIRATION RATE: 14 BRPM | SYSTOLIC BLOOD PRESSURE: 122 MMHG | TEMPERATURE: 99.4 F | HEART RATE: 98 BPM | BODY MASS INDEX: 24.99 KG/M2 | WEIGHT: 150 LBS | OXYGEN SATURATION: 100 % | DIASTOLIC BLOOD PRESSURE: 84 MMHG

## 2017-02-17 DIAGNOSIS — N30.01 ACUTE CYSTITIS WITH HEMATURIA: Primary | ICD-10-CM

## 2017-02-17 LAB
BACTERIA URNS QL MICRO: ABNORMAL /HPF
CASTS URNS QL MICRO: ABNORMAL /LPF
DEPRECATED S PYO AG THROAT QL EIA: NEGATIVE
EPI CELLS #/AREA URNS HPF: ABNORMAL /HPF
FLUAV AG NPH QL IA: NEGATIVE
FLUBV AG NPH QL IA: NEGATIVE
HCG UR QL: NEGATIVE
RBC #/AREA URNS HPF: ABNORMAL /HPF
WBC URNS QL MICRO: ABNORMAL /HPF

## 2017-02-17 PROCEDURE — 81003 URINALYSIS AUTO W/O SCOPE: CPT | Performed by: NURSE PRACTITIONER

## 2017-02-17 PROCEDURE — 74011250636 HC RX REV CODE- 250/636: Performed by: NURSE PRACTITIONER

## 2017-02-17 PROCEDURE — 96374 THER/PROPH/DIAG INJ IV PUSH: CPT | Performed by: NURSE PRACTITIONER

## 2017-02-17 PROCEDURE — 87804 INFLUENZA ASSAY W/OPTIC: CPT | Performed by: EMERGENCY MEDICINE

## 2017-02-17 PROCEDURE — 87086 URINE CULTURE/COLONY COUNT: CPT | Performed by: NURSE PRACTITIONER

## 2017-02-17 PROCEDURE — 99284 EMERGENCY DEPT VISIT MOD MDM: CPT | Performed by: NURSE PRACTITIONER

## 2017-02-17 PROCEDURE — 81025 URINE PREGNANCY TEST: CPT

## 2017-02-17 PROCEDURE — 87081 CULTURE SCREEN ONLY: CPT | Performed by: EMERGENCY MEDICINE

## 2017-02-17 PROCEDURE — 81015 MICROSCOPIC EXAM OF URINE: CPT | Performed by: NURSE PRACTITIONER

## 2017-02-17 PROCEDURE — 71020 XR CHEST PA LAT: CPT

## 2017-02-17 PROCEDURE — 87880 STREP A ASSAY W/OPTIC: CPT | Performed by: NURSE PRACTITIONER

## 2017-02-17 RX ORDER — KETOROLAC TROMETHAMINE 30 MG/ML
30 INJECTION, SOLUTION INTRAMUSCULAR; INTRAVENOUS
Status: COMPLETED | OUTPATIENT
Start: 2017-02-17 | End: 2017-02-17

## 2017-02-17 RX ORDER — SULFAMETHOXAZOLE AND TRIMETHOPRIM 800; 160 MG/1; MG/1
1 TABLET ORAL 2 TIMES DAILY
Qty: 14 TAB | Refills: 0 | Status: SHIPPED | OUTPATIENT
Start: 2017-02-17 | End: 2017-02-24

## 2017-02-17 RX ADMIN — KETOROLAC TROMETHAMINE 30 MG: 30 INJECTION, SOLUTION INTRAMUSCULAR; INTRAVENOUS at 14:16

## 2017-02-17 NOTE — DISCHARGE INSTRUCTIONS
Infección urinaria en las mujeres: Instrucciones de cuidado - [ Urinary Tract Infection in Women: Care Instructions ]  Instrucciones de cuidado    Charis infección urinaria (UTI, por shivani siglas en inglés) es un término general que hace referencia a charis infección que se produce en cualquier parte entre los riñones y la uretra (conducto por el cual se expulsa la orina). La mayoría de las UTI son infecciones de la vejiga. Con frecuencia, causan dolor o ardor al [place]. Las UTI son causadas por bacterias y pueden curarse con antibióticos. Asegúrese de completar el tratamiento para que la infección desaparezca. La atención de seguimiento es charis parte clave de lee tratamiento y seguridad. Asegúrese de hacer y acudir a todas las citas, y llame a lee médico si está teniendo problemas. También es charis buena idea saber los resultados de los exámenes y mantener charis lista de los medicamentos que eulalia. ¿Cómo puede cuidarse en el hogar? · [address] No deje de tomarlos por el hecho de sentirse mejor. Debe lucero todos los antibióticos hasta terminarlos. · Royce los próximos lore o [address], noelle mayor cantidad de [place] y otros líquidos. [place] puede ayudar a eliminar las bacterias que provocan la infección. (Si tiene charis enfermedad de los riñones, el corazón o el hígado y tiene que [place]'s líquidos, hable con lee médico antes de aumentar lee consumo). · Evite las bebidas gaseosas o con cafeína. Pueden irritar la vejiga. · Orine con frecuencia. Trate de vaciar la vejiga cada vez que orine. · Para aliviar el dolor, tome un baño caliente o colóquese charis almohadilla térmica a baja temperatura sobre la parte baja del abdomen o la zhen genital. Nunca se duerma mientras [place] charis almohadilla térmica. Para prevenir las infecciones urinarias  · Noelle abundante agua todos los días. [place] la ayuda a orinar con frecuencia, lo que elimina las bacterias de lee sistema.  (Si tiene [employer] riñones, el corazón o el hígado y tiene que Caren's líquidos, hable con lee médico antes de aumentar lee consumo). · Considere añadir el jugo de arándanos (\"cranberry\") a lee dieta. · Orine cuando necesite hacerlo. · Orine inmediatamente después de bri tenido [employer]. · Cámbiese las toallas sanitarias con frecuencia. · Evite el uso de lavados vaginales, los talia de burbujas, los [place] de higiene femenina y otros productos para la higiene femenina que contengan desodorantes. · Después de ir al baño, límpiese de adelante hacia atrás. ¿Cuándo debe pedir ayuda? Llame a lee médico ahora mismo o busque atención médica inmediata si:  · Aparecen síntomas negrito fiebre, escalofríos, náuseas o vómito por Shun Riser, o empeoran. · Tiene un nuevo dolor de espalda bre debajo de las [place]. [place] se llama dolor en el flanco.  · Tiene lola o pus nuevos en la orina. · Tiene problemas con lee antibiótico.  Preste especial atención a los cambios en lee jelly y asegúrese de comunicarse con lee médico si:  · No mejora después de bri tomado un antibiótico alessandra 2 días. · Tana síntomas desaparecen y [employer] Ricky. ¿Dónde puede encontrar más información en inglés? René Acevedo a http://yasmin-carly.info/. Ann Velarde [id number] en la búsqueda para aprender más acerca de \"Infección urinaria en las mujeres: Instrucciones de cuidado - [ Urinary Tract Infection in Women: Care Instructions ]. \"  Revisado: 12 agosto, 2016  Versión del contenido: 11.1  © 8827-9148 Healthwise, Incorporated. Las instrucciones de cuidado fueron adaptadas bajo licencia por [employer] (which disclaims liability or warranty for this information). Si usted tiene [place] afección médica o sobre estas instrucciones, siempre pregunte a lee profesional de jelly. Healthwise, Incorporated niega toda garantía o responsabilidad por lee uso de esta información.

## 2017-02-17 NOTE — ED NOTES
Discharge instructions and prescriptions provided and explained to the pt. Opportunity for questions provided.

## 2017-02-17 NOTE — ED PROVIDER NOTES
HPI Comments: Patient states generalized body aches and cough that started yesterday. She states fever started today. She denies urinary frequency and urinary pain. She states she was dx with UTI last month but took all her medication and symptoms resolved. Patient is a 51 y.o. female presenting with general illness. The history is provided by the patient. Generalized Body Aches   This is a new problem. The current episode started yesterday. The problem occurs hourly. The problem has not changed since onset. Associated symptoms include abdominal pain. Pertinent negatives include no chest pain, no headaches and no shortness of breath. Nothing aggravates the symptoms. Nothing relieves the symptoms. She has tried nothing for the symptoms. Past Medical History:   Diagnosis Date    HTN (hypertension) 4/12/2013    Hypertension      no meds    Missed ab      6wks    Other ill-defined conditions(739.89)      kidney stones       No past surgical history on file. No family history on file. Social History     Social History    Marital status: SINGLE     Spouse name: N/A    Number of children: N/A    Years of education: N/A     Occupational History    Not on file. Social History Main Topics    Smoking status: Former Smoker    Smokeless tobacco: Never Used    Alcohol use No    Drug use: No    Sexual activity: No     Other Topics Concern    Not on file     Social History Narrative         ALLERGIES: Amoxicillin and Morphine    Review of Systems   Constitutional: Positive for fever. Negative for chills and diaphoresis. HENT: Positive for sore throat. Negative for congestion, ear pain and sinus pressure. Respiratory: Positive for cough. Negative for shortness of breath. Cardiovascular: Negative for chest pain. Gastrointestinal: Positive for abdominal pain. Negative for constipation, diarrhea, nausea and vomiting. Genitourinary: Negative for difficulty urinating.    Musculoskeletal: Positive for back pain and myalgias. Skin: Negative for color change. Neurological: Negative for dizziness, weakness, numbness and headaches. Visit Vitals    /84 (BP 1 Location: Left arm, BP Patient Position: At rest)    Pulse 98    Temp 99.4 °F (37.4 °C)    Resp 14    Ht 5' 5\" (1.651 m)    Wt 68 kg (150 lb)    SpO2 100%    BMI 24.96 kg/m2              Physical Exam   Constitutional: She appears well-developed and well-nourished. No distress. HENT:   Head: Normocephalic and atraumatic. Right Ear: Hearing, tympanic membrane, external ear and ear canal normal.   Left Ear: Hearing, tympanic membrane, external ear and ear canal normal.   Nose: Nose normal.   Neck: Normal range of motion. Neck supple. No tracheal deviation present. No thyromegaly present. Cardiovascular: Normal rate, regular rhythm, normal heart sounds and intact distal pulses. No murmur heard. Pulmonary/Chest: Effort normal and breath sounds normal. No respiratory distress. She has no wheezes. Abdominal: Soft. Normal appearance and bowel sounds are normal. There is tenderness in the suprapubic area. Musculoskeletal: Normal range of motion. Neurological: She is alert. Skin: Skin is warm and dry. No rash noted. She is not diaphoretic. No erythema. Psychiatric: She has a normal mood and affect. Her behavior is normal.   Nursing note and vitals reviewed.      2:58 PM-lab states they have patient's urine but that have not started test  Recent Results (from the past 12 hour(s))   INFLUENZA A & B AG (RAPID TEST)    Collection Time: 02/17/17  1:20 PM   Result Value Ref Range    Influenza A Ag NEGATIVE  NEG      Influenza B Ag NEGATIVE  NEG     HCG URINE, QL. - POC    Collection Time: 02/17/17  1:57 PM   Result Value Ref Range    Pregnancy test,urine (POC) NEGATIVE  NEG     URINE MICROSCOPIC    Collection Time: 02/17/17  2:00 PM   Result Value Ref Range    WBC 5-10 0 /hpf    RBC 0-3 0 /hpf    Epithelial cells 10-20 0 /hpf    Bacteria 2+ (H) 0 /hpf    Casts 5-10 0 /lpf   STREP AG SCREEN, GROUP A    Collection Time: 02/17/17  2:10 PM   Result Value Ref Range    Group A Strep Ag ID NEGATIVE  NEG         MDM  Number of Diagnoses or Management Options  Acute cystitis with hematuria: new and does not require workup  Diagnosis management comments: UA positive for blood. Micro showed 2+ bacteria and WBC. Urine sent for culture. Prescription for bactrim given. Negative flu and strep swab. Negative chest xray. Tachycardia improved after receiving IM Toradol. Discharge home follow up as needed.         Amount and/or Complexity of Data Reviewed  Clinical lab tests: ordered and reviewed  Tests in the radiology section of CPT®: ordered and reviewed  Tests in the medicine section of CPT®: ordered and reviewed    Patient Progress  Patient progress: stable    ED Course       Procedures

## 2017-02-19 LAB
BACTERIA SPEC CULT: NORMAL
SERVICE CMNT-IMP: NORMAL
SERVICE CMNT-IMP: NORMAL

## 2017-03-02 ENCOUNTER — HOSPITAL ENCOUNTER (OUTPATIENT)
Dept: LAB | Age: 47
Discharge: HOME | End: 2017-03-02

## 2017-03-03 PROCEDURE — 87491 CHLMYD TRACH DNA AMP PROBE: CPT | Performed by: FAMILY MEDICINE

## 2017-03-04 ENCOUNTER — HOSPITAL ENCOUNTER (EMERGENCY)
Age: 47
Discharge: HOME | End: 2017-03-04
Attending: EMERGENCY MEDICINE
Payer: SELF-PAY

## 2017-03-04 ENCOUNTER — APPOINTMENT (OUTPATIENT)
Dept: GENERAL RADIOLOGY | Age: 47
End: 2017-03-04
Payer: SELF-PAY

## 2017-03-04 VITALS
WEIGHT: 150 LBS | HEART RATE: 100 BPM | TEMPERATURE: 99.1 F | HEIGHT: 65 IN | SYSTOLIC BLOOD PRESSURE: 117 MMHG | OXYGEN SATURATION: 99 % | DIASTOLIC BLOOD PRESSURE: 72 MMHG | BODY MASS INDEX: 24.99 KG/M2 | RESPIRATION RATE: 17 BRPM

## 2017-03-04 DIAGNOSIS — J18.9 COMMUNITY ACQUIRED PNEUMONIA: ICD-10-CM

## 2017-03-04 DIAGNOSIS — J06.9 ACUTE UPPER RESPIRATORY INFECTION: Primary | ICD-10-CM

## 2017-03-04 LAB
BACTERIA URNS QL MICRO: 0 /HPF
CASTS URNS QL MICRO: 0 /LPF
CRYSTALS URNS QL MICRO: 0 /LPF
DEPRECATED S PYO AG THROAT QL EIA: NEGATIVE
EPI CELLS #/AREA URNS HPF: NORMAL /HPF
FLUAV AG NPH QL IA: NEGATIVE
FLUBV AG NPH QL IA: NEGATIVE
HCG UR QL: NEGATIVE
MUCOUS THREADS URNS QL MICRO: 0 /LPF
RBC #/AREA URNS HPF: NORMAL /HPF
WBC URNS QL MICRO: NORMAL /HPF

## 2017-03-04 PROCEDURE — 74011250637 HC RX REV CODE- 250/637: Performed by: PHYSICIAN ASSISTANT

## 2017-03-04 PROCEDURE — 87081 CULTURE SCREEN ONLY: CPT | Performed by: EMERGENCY MEDICINE

## 2017-03-04 PROCEDURE — 87804 INFLUENZA ASSAY W/OPTIC: CPT | Performed by: EMERGENCY MEDICINE

## 2017-03-04 PROCEDURE — 74011000250 HC RX REV CODE- 250: Performed by: PHYSICIAN ASSISTANT

## 2017-03-04 PROCEDURE — 94640 AIRWAY INHALATION TREATMENT: CPT

## 2017-03-04 PROCEDURE — 71020 XR CHEST PA LAT: CPT

## 2017-03-04 PROCEDURE — 81025 URINE PREGNANCY TEST: CPT

## 2017-03-04 PROCEDURE — 99283 EMERGENCY DEPT VISIT LOW MDM: CPT | Performed by: EMERGENCY MEDICINE

## 2017-03-04 PROCEDURE — 87880 STREP A ASSAY W/OPTIC: CPT | Performed by: PHYSICIAN ASSISTANT

## 2017-03-04 PROCEDURE — 81003 URINALYSIS AUTO W/O SCOPE: CPT | Performed by: EMERGENCY MEDICINE

## 2017-03-04 PROCEDURE — 81015 MICROSCOPIC EXAM OF URINE: CPT | Performed by: PHYSICIAN ASSISTANT

## 2017-03-04 RX ORDER — AZITHROMYCIN 250 MG/1
TABLET, FILM COATED ORAL
Qty: 6 TAB | Refills: 0 | Status: SHIPPED | OUTPATIENT
Start: 2017-03-04 | End: 2017-03-04

## 2017-03-04 RX ORDER — BENZONATATE 200 MG/1
200 CAPSULE ORAL
Qty: 30 CAP | Refills: 0 | Status: ON HOLD | OUTPATIENT
Start: 2017-03-04 | End: 2017-03-06 | Stop reason: CLARIF

## 2017-03-04 RX ORDER — DEXAMETHASONE SODIUM PHOSPHATE 100 MG/10ML
10 INJECTION INTRAMUSCULAR; INTRAVENOUS
Status: COMPLETED | OUTPATIENT
Start: 2017-03-04 | End: 2017-03-04

## 2017-03-04 RX ORDER — AZITHROMYCIN 250 MG/1
TABLET, FILM COATED ORAL
Qty: 6 TAB | Refills: 0 | Status: ON HOLD | OUTPATIENT
Start: 2017-03-04 | End: 2017-03-06 | Stop reason: CLARIF

## 2017-03-04 RX ORDER — BENZONATATE 100 MG/1
100 CAPSULE ORAL
COMMUNITY
End: 2017-03-04

## 2017-03-04 RX ORDER — ALBUTEROL SULFATE 90 UG/1
2 AEROSOL, METERED RESPIRATORY (INHALATION)
Qty: 1 INHALER | Refills: 0 | Status: SHIPPED | OUTPATIENT
Start: 2017-03-04 | End: 2017-03-04

## 2017-03-04 RX ORDER — PREDNISONE 10 MG/1
10 TABLET ORAL
Qty: 45 TAB | Refills: 0 | Status: SHIPPED | OUTPATIENT
Start: 2017-03-04 | End: 2017-09-07

## 2017-03-04 RX ORDER — BENZONATATE 200 MG/1
200 CAPSULE ORAL
Qty: 30 CAP | Refills: 0 | Status: SHIPPED | OUTPATIENT
Start: 2017-03-04 | End: 2017-03-04

## 2017-03-04 RX ORDER — ALBUTEROL SULFATE 90 UG/1
2 AEROSOL, METERED RESPIRATORY (INHALATION)
Qty: 1 INHALER | Refills: 0 | Status: ON HOLD | OUTPATIENT
Start: 2017-03-04 | End: 2017-03-06 | Stop reason: CLARIF

## 2017-03-04 RX ORDER — ACETAMINOPHEN 500 MG
1000 TABLET ORAL
Status: COMPLETED | OUTPATIENT
Start: 2017-03-04 | End: 2017-03-04

## 2017-03-04 RX ORDER — IPRATROPIUM BROMIDE AND ALBUTEROL SULFATE 2.5; .5 MG/3ML; MG/3ML
3 SOLUTION RESPIRATORY (INHALATION)
Status: COMPLETED | OUTPATIENT
Start: 2017-03-04 | End: 2017-03-04

## 2017-03-04 RX ORDER — GUAIFENESIN 600 MG/1
600 TABLET, EXTENDED RELEASE ORAL 2 TIMES DAILY
COMMUNITY
End: 2017-03-04

## 2017-03-04 RX ADMIN — DEXAMETHASONE SODIUM PHOSPHATE 10 MG: 10 INJECTION INTRAMUSCULAR; INTRAVENOUS at 12:13

## 2017-03-04 RX ADMIN — IPRATROPIUM BROMIDE AND ALBUTEROL SULFATE 3 ML: 2.5; .5 SOLUTION RESPIRATORY (INHALATION) at 11:20

## 2017-03-04 RX ADMIN — ACETAMINOPHEN 1000 MG: 500 TABLET ORAL at 11:12

## 2017-03-04 NOTE — PROGRESS NOTES
present at bedside during assessment by Viry Apple.     [address] [place] [address]  [phone]

## 2017-03-04 NOTE — DISCHARGE INSTRUCTIONS
Neumonía: Instrucciones de cuidado - [ Pneumonia: Care Instructions ]  Instrucciones de cuidado    La neumonía es charis infección de los pulmones. Shanika Nanette de los casos son causados por infecciones debidas a bacterias o virus. La neumonía puede ser leve o muy grave. Si es causada por bacterias, será tratado con antibióticos. La recuperación completa de la neumonía podría lucero [employer] o algunos meses, dependiendo de qué tan enfermo estuvo y si lee estado general de jelly es june. La atención de seguimiento es charis parte clave de lee tratamiento y seguridad. Asegúrese de hacer y acudir a todas las citas, y llame a lee médico si está teniendo problemas. También es charis buena idea saber los resultados de los exámenes y mantener charis lista de los medicamentos que eulalia. ¿Cómo puede cuidarse en el hogar? · [address] indicaciones. No deje de tomarlos por el hecho de sentirse mejor. Debe lucero todos los antibióticos hasta terminarlos. · [employer] medicamentos exactamente negrito le fueron recetados. Llame a lee médico si desiree estar teniendo problemas con lee medicamento. · Descanse y duerma lo suficiente. Podría sentirse cansado y débil alessandra un [address], sasha lee nivel de energía mejorará con [place]. · Para prevenir la deshidratación, aleksandar abundantes líquidos, los suficientes para que lee orina sea de color amarillo pramod o jered negrito el agua. Elija agua y otros líquidos ashley sin cafeína hasta que se sienta mejor. Si tiene charis enfermedad del riñón, del corazón o del hígado y tiene que Caren's líquidos, hable con lee médico antes de aumentar lee consumo. · Trate la tos para que pueda descansar. La tos con mucosidad (flema) de los pulmones es común con la neumonía. Es charis de las [employer] que lee organismo [address].  Sasha si la tos le impide descansar o le causa gran fatiga y dolor en la pared torácica, hable con lee médico. Podría sugerirle que tome un medicamento para aliviar la tos. · Utilice un vaporizador o humidificador para añadir humedad en lee habitación. Siga las indicaciones para limpiar el aparato. · No fume ni permita que otras personas fumen cerca de usted. Fumar hará que la tos dure [place]. Si necesita ayuda para dejar de fumar, hable con lee médico [employer] y medicamentos para dejar de fumar. Éstos pueden aumentar shivani probabilidades de dejar el hábito para siempre. · [place] un analgésico (medicamento para el dolor) de venta ozzy, negrito acetaminofén (Tylenol), ibuprofeno (Advil, Motrin) o naproxeno (Aleve). Kait y siga todas las instrucciones de la [place]. · No tome dos o más analgésicos al mismo tiempo a menos que el médico se lo haya indicado. Muchos analgésicos contienen acetaminofén, es decir, Tylenol. El exceso de acetaminofén (Tylenol) puede ser dañino. · Si le dieron un espirómetro para medir qué tan jen están funcionando shivani pulmones, utilícelo negrito se lo indicaron. [place] puede ayudar a lee médico a saber cómo va lee recuperación. · Para prevenir la neumonía en el futuro, hable con lee médico acerca de vacunarse contra la gripe (charis vez al año) y [employer] antineumocócica (sólo charis vez para la [address]). ¿Cuándo debe pedir ayuda? Llame al 911 en cualquier momento que considere que necesita atención de emergencia. Por ejemplo, llame si:  · [address] dificultades para respirar. Llame a lee médico ahora mismo o busque atención médica inmediata si:  · Tose mucosidad (esputo) de color café oscuro o con lola. · Tiene nueva o peor dificultad para respirar. · Siente mareos o aturdimiento, o que está a punto de [place]. Preste especial atención a los cambios en lee jelly y asegúrese de comunicarse con lee médico si:  · Presenta fiebre o la fiebre es más andrés. · Lee tos es más profunda o más frecuente que antes. · No está mejorando después de 2 días (48 horas). · No mejora negrito se esperaba.   ¿Dónde puede encontrar más información en inglés? Indiana Villatoro a http://yasmin-carly.info/. Neno Constantino [id number] en la búsqueda para aprender más acerca de \"Neumonía: Instrucciones de cuidado - [ Pneumonia: Care Instructions ]. \"  Revisado: 23 crandall, 2016  Versión del contenido: 11.1  © 2228-7047 Healthwise, Incorporated. Las instrucciones de cuidado fueron adaptadas bajo licencia por [employer] (which disclaims liability or warranty for this information). Si usted tiene [place] afección médica o sobre estas instrucciones, siempre pregunte a lee profesional de jelly. Healthwise, Incorporated niega toda garantía o responsabilidad por lee uso de esta información. Infección de las vías respiratorias altas (Theresa Cervantes): Instrucciones de cuidado - [ Upper Respiratory Infection (Cold): Care Instructions ]  Instrucciones de cuidado    La infección de las vías respiratorias altas (o URI, por shivani siglas en inglés), es charis infección de la [place], los senos paranasales o la garganta. Las URI se transmiten por la tos, los estornudos y el contacto directo. El resfriado común es el tipo más frecuente de URI. La gripe y las infecciones de los senos paranasales son otros tipos de URI. [place] todas las URI son causadas por virus. Los antibióticos no las Jackie Simmers. Sin embargo, usted puede tratar la mayoría de estas infecciones con cuidados en el hogar. [place] puede implicar beber muchos líquidos y lucero analgésicos (medicamentos para el dolor) de venta ozzy. Es probable que se sienta mejor al cabo de 4 a 10 días. El médico lo delacruz revisado minuciosamente, evans se pueden presentar problemas más tarde. Si nota algún problema o nuevos síntomas, busque tratamiento médico inmediatamente. La atención de seguimiento es charis parte clave de lee tratamiento y seguridad. Asegúrese de hacer y acudir a todas las citas, y llame a lee médico si está teniendo problemas.  También es charis buena idea saber los resultados de shivani exámenes y mantener charis lista de los medicamentos que eulalia. ¿Cómo puede cuidarse en el hogar? · Para prevenir la deshidratación, aleksandar abundantes líquidos, los suficientes negrito para que lee orina sea de color amarillo pramod o transparente negrito el agua. Opte por beber agua y otros líquidos ashley sin cafeína hasta que se sienta mejor. Si tiene [employer], del corazón o del hígado y tiene que [place] líquidos, hable con lee médico antes de aumentar lee consumo. · [place] un analgésico de venta ozzy, negrito acetaminofén (Tylenol), ibuprofeno (Advil, Motrin) o naproxeno (Aleve). Kait y siga todas las instrucciones de la [place]. · Antes de usar medicamentos para la tos y los resfriados, revise la [place]. Estos medicamentos podrían no ser seguros para los niños pequeños o las personas con ciertos problemas de [place]. · Tenga cuidado cuando tome medicamentos de venta ozzy para el resfriado común o la gripe y Tylenol al [employer]. Muchos de estos medicamentos contienen acetaminofén, o sea, Tylenol. Kait las etiquetas para asegurarse de que no está tomando charis dosis mayor que la recomendada. El exceso de acetaminofén (Tylenol) puede ser dañino. · Descanse lo suficiente. · No fume ni permita que otros fumen cerca de usted. Si necesita ayuda para dejar de fumar, hable con lee médico acerca de programas y medicamentos para dejar de fumar. Estos pueden aumentar shivani probabilidades de dejar el hábito para siempre. ¿Cuándo debe pedir ayuda? Llame al 911 en cualquier momento que considere que necesita atención de [place]. Por ejemplo, llame si:  · Tiene graves dificultades para respirar. Llame a lee médico ahora mismo o busque atención médica inmediata si:  · Le parece que está mucho más enfermo. · Tiene nueva o peor dificultad para respirar. · Tiene fiebre nueva o más andrés. · Tiene un salpullido nuevo.   Preste especial atención a los cambios en lee jelly y asegúrese de comunicarse con lee médico si:  · [place] síntomas nuevos, negrito dolor de garganta, dolor de oídos o dolor de los senos paranasales. · Saenz tos es más profunda o más frecuente que antes, especialmente si nota más mucosidad o un cambio en el color de la mucosidad. · No mejora negrito se esperaba. ¿Dónde puede encontrar más información en inglés? Arturo Grady a http://yasmin-carly.info/. Kenyon [id number] en la búsqueda para aprender más acerca de \"Infección de las vías respiratorias altas (Ehsan Rdz): Instrucciones de cuidado - [ Upper Respiratory Infection (Cold): Care Instructions ]. \"  Revisado: 24 crandall, 2016  Versión del contenido: 11.1  © 0029-0012 Healthwise, Incorporated. Las instrucciones de cuidado fueron adaptadas bajo licencia por Good [facility] Connections (which disclaims liability or warranty for this information). Si usted tiene [place] afección médica o sobre estas instrucciones, siempre pregunte a saenz profesional de jelly. Healthwise, Incorporated niega toda garantía o responsabilidad por saenz uso de esta información.

## 2017-03-04 NOTE — ED NOTES
Discharge instructions given verbally and in written form Pt verbalized understanding of instructions and prescriptions given. Pt left the ER ambulatory.

## 2017-03-04 NOTE — LETTER
[facility] EMERGENCY DEPT [address] [id number] [phone] Work/School Note Date: 3/4/2017 To Whom It May concern: 
 
Alexx Jade was seen and treated today in the emergency room by the following provider(s): 
Attending Provider: Manan Ortega MD 
Physician Assistant: LUISA Hall. Alexx Jade may return to work on 03/08/17. Sincerely, LUISA Hall

## 2017-03-04 NOTE — ED PROVIDER NOTES
HPI Comments: Patient is here with nasal congestion, dry cough, body aches, chills, fever and not feeling well since yesterday. She did not get a flu shot. No chest pain or shortness of breath. She does have pain to her left posterior back area and states \"it is in my lung. \"  She has had pneumonia before and this feels similar. She is ambulatory to the room without difficulty and well-hydrated. She has no rash. She does have a slight sore throat as well. She states someone else at home has been sick with a cough as well. Her LMP was over two months ago. No nausea, vomiting, trouble with urination or bowel movements. She does not smoke and has no history of blood clots. No swelling of her arms or legs. Patient is a 51 y.o. female presenting with fever and general illness. The history is provided by the patient. The history is limited by a language barrier. A  was used. Fever    Associated symptoms include congestion, sore throat and cough. Pertinent negatives include no shortness of breath. Generalized Body Aches   Pertinent negatives include no shortness of breath. Past Medical History:   Diagnosis Date    HTN (hypertension) 4/12/2013    Hypertension     no meds    Missed ab     6wks    Other ill-defined conditions(799.89)     kidney stones       History reviewed. No pertinent surgical history. History reviewed. No pertinent family history. Social History     Social History    Marital status: SINGLE     Spouse name: N/A    Number of children: N/A    Years of education: N/A     Occupational History    Not on file. Social History Main Topics    Smoking status: Former Smoker    Smokeless tobacco: Never Used    Alcohol use No    Drug use: No    Sexual activity: No     Other Topics Concern    Not on file     Social History Narrative         ALLERGIES: Amoxicillin and Morphine    Review of Systems   Constitutional: Positive for fever.    HENT: Positive for congestion, rhinorrhea and sore throat. Eyes: Negative. Respiratory: Positive for cough. Negative for shortness of breath. Cardiovascular: Negative. Gastrointestinal: Negative. Genitourinary: Negative. Musculoskeletal: Negative. Skin: Negative. Neurological: Negative. Psychiatric/Behavioral: Negative. All other systems reviewed and are negative. Vitals:    03/04/17 1037   BP: 128/63   Pulse: (!) 111   Resp: 18   Temp: 99.1 °F (37.3 °C)   SpO2: 98%   Weight: 68 kg (150 lb)   Height: 5' 5\" (1.651 m)            Physical Exam   Constitutional: She is oriented to person, place, and time. She appears well-developed and well-nourished. HENT:   Head: Normocephalic and atraumatic. Right Ear: External ear normal.   Left Ear: External ear normal.   Clear rhinorrhea, posterior pharyngeal erythema. Eyes: Conjunctivae and EOM are normal. Pupils are equal, round, and reactive to light. Neck: Normal range of motion. Neck supple. Cardiovascular: Normal rate, regular rhythm, normal heart sounds and intact distal pulses. Pulmonary/Chest: Effort normal and breath sounds normal.   Abdominal: Soft. Bowel sounds are normal.   Musculoskeletal: Normal range of motion. Neurological: She is alert and oriented to person, place, and time. She has normal reflexes. Skin: Skin is warm and dry. Psychiatric: She has a normal mood and affect. Her behavior is normal. Judgment and thought content normal.   Nursing note and vitals reviewed.        MDM  Number of Diagnoses or Management Options  Acute upper respiratory infection: minor  Community acquired pneumonia: minor     Amount and/or Complexity of Data Reviewed  Clinical lab tests: ordered and reviewed  Tests in the radiology section of CPT®: ordered and reviewed    Risk of Complications, Morbidity, and/or Mortality  Presenting problems: moderate  Diagnostic procedures: moderate  Management options: moderate    Patient Progress  Patient progress: improved    ED Course       Procedures    The patient was observed in the ED. Results Reviewed:      Recent Results (from the past 24 hour(s))   INFLUENZA A & B AG (RAPID TEST)    Collection Time: 03/04/17 10:35 AM   Result Value Ref Range    Influenza A Ag NEGATIVE  NEG      Influenza B Ag NEGATIVE  NEG     STREP AG SCREEN, GROUP A    Collection Time: 03/04/17 11:12 AM   Result Value Ref Range    Group A Strep Ag ID NEGATIVE  NEG     HCG URINE, QL. - POC    Collection Time: 03/04/17 11:19 AM   Result Value Ref Range    Pregnancy test,urine (POC) NEGATIVE  NEG     URINE MICROSCOPIC    Collection Time: 03/04/17 11:25 AM   Result Value Ref Range    WBC 0-3 0 /hpf    RBC 0-3 0 /hpf    Epithelial cells 0-3 0 /hpf    Bacteria 0 0 /hpf    Casts 0 0 /lpf    Crystals 0 0 /LPF    Mucus 0 0 /lpf     XR CHEST PA LAT   Final Result   IMPRESSION:    1.  New mild left lower lobe infiltrate. Early pneumonia is not excluded given   the clinical history provided. A follow-up chest x-ray would be recommended to   ensure resolution. I will treat patient for pneumonia based on her symptoms and the x-ray findings today. She should drink plenty of fluids, rest, take the prednisone, albuterol, Mucinex antibiotics as instructed. Return to the ED if worse. Patient is feeling much better after the tylenol, decadron and duoneb. I discussed the results of all labs, procedures, radiographs, and treatments with the patient and available family. Treatment plan is agreed upon and the patient is ready for discharge. All voiced understanding of the discharge plan and medication instructions or changes as appropriate. Questions about treatment in the ED were answered. All were encouraged to return should symptoms worsen or new problems develop.

## 2017-03-06 ENCOUNTER — HOSPITAL ENCOUNTER (INPATIENT)
Age: 47
LOS: 2 days | Discharge: HOME | DRG: 871 | End: 2017-03-08
Attending: EMERGENCY MEDICINE | Admitting: INTERNAL MEDICINE
Payer: SELF-PAY

## 2017-03-06 ENCOUNTER — APPOINTMENT (OUTPATIENT)
Dept: GENERAL RADIOLOGY | Age: 47
DRG: 871 | End: 2017-03-06
Attending: EMERGENCY MEDICINE
Payer: SELF-PAY

## 2017-03-06 DIAGNOSIS — A41.9 SEVERE SEPSIS (HCC): Primary | ICD-10-CM

## 2017-03-06 DIAGNOSIS — D72.829 LEUKOCYTOSIS, UNSPECIFIED TYPE: ICD-10-CM

## 2017-03-06 DIAGNOSIS — J18.9 COMMUNITY ACQUIRED PNEUMONIA: ICD-10-CM

## 2017-03-06 DIAGNOSIS — R07.1 CHEST PAIN ON BREATHING: ICD-10-CM

## 2017-03-06 DIAGNOSIS — A41.9 SEPSIS, DUE TO UNSPECIFIED ORGANISM: ICD-10-CM

## 2017-03-06 DIAGNOSIS — R09.02 HYPOXEMIA: ICD-10-CM

## 2017-03-06 DIAGNOSIS — R65.20 SEVERE SEPSIS (HCC): Primary | ICD-10-CM

## 2017-03-06 DIAGNOSIS — R00.0 TACHYCARDIA: ICD-10-CM

## 2017-03-06 DIAGNOSIS — J18.9 CAP (COMMUNITY ACQUIRED PNEUMONIA): ICD-10-CM

## 2017-03-06 PROBLEM — R79.89 ELEVATED LACTIC ACID LEVEL: Status: ACTIVE | Noted: 2017-03-06

## 2017-03-06 PROBLEM — R07.9 CHEST PAIN: Status: ACTIVE | Noted: 2017-03-06

## 2017-03-06 LAB
ALBUMIN SERPL BCP-MCNC: 3.2 G/DL (ref 3.5–5)
ALBUMIN/GLOB SERPL: 0.7 {RATIO} (ref 1.2–3.5)
ALP SERPL-CCNC: 99 U/L (ref 50–136)
ALT SERPL-CCNC: 13 U/L (ref 12–65)
ANION GAP BLD CALC-SCNC: 12 MMOL/L (ref 7–16)
APPEARANCE UR: CLEAR
ARTERIAL PATENCY WRIST A: POSITIVE
AST SERPL W P-5'-P-CCNC: 10 U/L (ref 15–37)
ATRIAL RATE: 141 BPM
BACTERIA SPEC CULT: NORMAL
BACTERIA SPEC CULT: NORMAL
BACTERIA URNS QL MICRO: 0 /HPF
BASE DEFICIT BLDA-SCNC: 3.8 MMOL/L (ref 0–2)
BASOPHILS # BLD AUTO: 0 K/UL (ref 0–0.2)
BASOPHILS # BLD: 0 % (ref 0–2)
BDY SITE: ABNORMAL
BILIRUB SERPL-MCNC: 0.7 MG/DL (ref 0.2–1.1)
BILIRUB UR QL: NEGATIVE
BUN SERPL-MCNC: 9 MG/DL (ref 6–23)
CALCIUM SERPL-MCNC: 8.6 MG/DL (ref 8.3–10.4)
CALCULATED P AXIS, ECG09: 58 DEGREES
CALCULATED R AXIS, ECG10: 48 DEGREES
CALCULATED T AXIS, ECG11: -2 DEGREES
CHLORIDE SERPL-SCNC: 105 MMOL/L (ref 98–107)
CO2 SERPL-SCNC: 23 MMOL/L (ref 21–32)
COHGB MFR BLD: 0.4 % (ref 0.5–1.5)
COLOR UR: ABNORMAL
CREAT SERPL-MCNC: 0.94 MG/DL (ref 0.6–1)
DIAGNOSIS, 93000: NORMAL
DIASTOLIC BP, ECG02: NORMAL MMHG
DIFFERENTIAL METHOD BLD: ABNORMAL
DO-HGB BLD-MCNC: 10 % (ref 0–5)
EOSINOPHIL # BLD: 0 K/UL (ref 0–0.8)
EOSINOPHIL NFR BLD: 0 % (ref 0.5–7.8)
EPI CELLS #/AREA URNS HPF: ABNORMAL /HPF
ERYTHROCYTE [DISTWIDTH] IN BLOOD BY AUTOMATED COUNT: 16.7 % (ref 11.9–14.6)
ERYTHROCYTE [DISTWIDTH] IN BLOOD BY AUTOMATED COUNT: 16.8 % (ref 11.9–14.6)
FLUAV AG NPH QL IA: NEGATIVE
FLUBV AG NPH QL IA: NEGATIVE
GLOBULIN SER CALC-MCNC: 4.8 G/DL (ref 2.3–3.5)
GLUCOSE BLD STRIP.AUTO-MCNC: 106 MG/DL (ref 65–100)
GLUCOSE BLD STRIP.AUTO-MCNC: 113 MG/DL (ref 65–100)
GLUCOSE BLD STRIP.AUTO-MCNC: 137 MG/DL (ref 65–100)
GLUCOSE BLD STRIP.AUTO-MCNC: 145 MG/DL (ref 65–100)
GLUCOSE SERPL-MCNC: 94 MG/DL (ref 65–100)
GLUCOSE UR STRIP.AUTO-MCNC: NEGATIVE MG/DL
HCO3 BLDA-SCNC: 20 MMOL/L (ref 22–26)
HCT VFR BLD AUTO: 25.6 % (ref 35.8–46.3)
HCT VFR BLD AUTO: 33.8 % (ref 35.8–46.3)
HGB BLD-MCNC: 10.6 G/DL (ref 11.7–15.4)
HGB BLD-MCNC: 7.6 G/DL (ref 11.7–15.4)
HGB BLDMV-MCNC: 8.8 GM/DL (ref 11.7–15)
HGB UR QL STRIP: ABNORMAL
IMM GRANULOCYTES # BLD: 0.1 K/UL (ref 0–0.5)
IMM GRANULOCYTES NFR BLD AUTO: 0.6 % (ref 0–5)
INR PPP: 1.1 (ref 0.9–1.2)
KETONES UR QL STRIP.AUTO: NEGATIVE MG/DL
LACTATE BLD-SCNC: 2 MMOL/L (ref 0.5–1.9)
LACTATE BLD-SCNC: <0.3 MMOL/L (ref 0.5–1.9)
LEUKOCYTE ESTERASE UR QL STRIP.AUTO: NEGATIVE
LYMPHOCYTES # BLD AUTO: 11 % (ref 13–44)
LYMPHOCYTES # BLD: 1.9 K/UL (ref 0.5–4.6)
MCH RBC QN AUTO: 23 PG (ref 26.1–32.9)
MCH RBC QN AUTO: 24.4 PG (ref 26.1–32.9)
MCHC RBC AUTO-ENTMCNC: 29.7 G/DL (ref 31.4–35)
MCHC RBC AUTO-ENTMCNC: 31.4 G/DL (ref 31.4–35)
MCV RBC AUTO: 77.3 FL (ref 79.6–97.8)
MCV RBC AUTO: 77.7 FL (ref 79.6–97.8)
METHGB MFR BLD: 0.3 % (ref 0–1.5)
MONOCYTES # BLD: 2 K/UL (ref 0.1–1.3)
MONOCYTES NFR BLD AUTO: 12 % (ref 4–12)
NEUTS SEG # BLD: 13.3 K/UL (ref 1.7–8.2)
NEUTS SEG NFR BLD AUTO: 76 % (ref 43–78)
NITRITE UR QL STRIP.AUTO: NEGATIVE
OXYHGB MFR BLDA: 89.7 % (ref 94–97)
P-R INTERVAL, ECG05: 124 MS
PCO2 BLDA: 34 MMHG (ref 35–45)
PH BLDA: 7.4 [PH] (ref 7.35–7.45)
PH UR STRIP: 6.5 [PH] (ref 5–9)
PLATELET # BLD AUTO: 217 K/UL (ref 150–450)
PLATELET # BLD AUTO: 286 K/UL (ref 150–450)
PMV BLD AUTO: 11.3 FL (ref 10.8–14.1)
PMV BLD AUTO: 11.6 FL (ref 10.8–14.1)
PO2 BLDA: 61 MMHG (ref 75–100)
POTASSIUM SERPL-SCNC: 3.8 MMOL/L (ref 3.5–5.1)
PROCALCITONIN SERPL-MCNC: 0.5 NG/ML
PROT SERPL-MCNC: 8 G/DL (ref 6.3–8.2)
PROT UR STRIP-MCNC: ABNORMAL MG/DL
PROTHROMBIN TIME: 12 SEC (ref 9.6–12)
Q-T INTERVAL, ECG07: 270 MS
QRS DURATION, ECG06: 58 MS
QTC CALCULATION (BEZET), ECG08: 413 MS
RBC # BLD AUTO: 3.31 M/UL (ref 4.05–5.25)
RBC # BLD AUTO: 4.35 M/UL (ref 4.05–5.25)
RBC #/AREA URNS HPF: ABNORMAL /HPF
SAO2 % BLD: 90 % (ref 92–98.5)
SERVICE CMNT-IMP: NORMAL
SERVICE CMNT-IMP: NORMAL
SODIUM SERPL-SCNC: 140 MMOL/L (ref 136–145)
SP GR UR REFRACTOMETRY: 1.02 (ref 1–1.02)
SYSTOLIC BP, ECG01: NORMAL MMHG
UROBILINOGEN UR QL STRIP.AUTO: 1 EU/DL (ref 0.2–1)
VENTRICULAR RATE, ECG03: 141 BPM
WBC # BLD AUTO: 17.1 K/UL (ref 4.3–11.1)
WBC # BLD AUTO: 17.3 K/UL (ref 4.3–11.1)
WBC URNS QL MICRO: ABNORMAL /HPF

## 2017-03-06 PROCEDURE — 65270000029 HC RM PRIVATE

## 2017-03-06 PROCEDURE — 87040 BLOOD CULTURE FOR BACTERIA: CPT | Performed by: EMERGENCY MEDICINE

## 2017-03-06 PROCEDURE — 87641 MR-STAPH DNA AMP PROBE: CPT | Performed by: INTERNAL MEDICINE

## 2017-03-06 PROCEDURE — 74011000250 HC RX REV CODE- 250: Performed by: INTERNAL MEDICINE

## 2017-03-06 PROCEDURE — 82803 BLOOD GASES ANY COMBINATION: CPT

## 2017-03-06 PROCEDURE — 81003 URINALYSIS AUTO W/O SCOPE: CPT | Performed by: INTERNAL MEDICINE

## 2017-03-06 PROCEDURE — 83605 ASSAY OF LACTIC ACID: CPT

## 2017-03-06 PROCEDURE — 96375 TX/PRO/DX INJ NEW DRUG ADDON: CPT | Performed by: EMERGENCY MEDICINE

## 2017-03-06 PROCEDURE — 36600 WITHDRAWAL OF ARTERIAL BLOOD: CPT

## 2017-03-06 PROCEDURE — 74011250636 HC RX REV CODE- 250/636: Performed by: EMERGENCY MEDICINE

## 2017-03-06 PROCEDURE — 74011250636 HC RX REV CODE- 250/636: Performed by: INTERNAL MEDICINE

## 2017-03-06 PROCEDURE — 80053 COMPREHEN METABOLIC PANEL: CPT | Performed by: EMERGENCY MEDICINE

## 2017-03-06 PROCEDURE — 85027 COMPLETE CBC AUTOMATED: CPT | Performed by: EMERGENCY MEDICINE

## 2017-03-06 PROCEDURE — 74011250637 HC RX REV CODE- 250/637: Performed by: INTERNAL MEDICINE

## 2017-03-06 PROCEDURE — 77030019605

## 2017-03-06 PROCEDURE — 84145 PROCALCITONIN (PCT): CPT | Performed by: EMERGENCY MEDICINE

## 2017-03-06 PROCEDURE — 77010033678 HC OXYGEN DAILY

## 2017-03-06 PROCEDURE — 87804 INFLUENZA ASSAY W/OPTIC: CPT | Performed by: EMERGENCY MEDICINE

## 2017-03-06 PROCEDURE — 87449 NOS EACH ORGANISM AG IA: CPT | Performed by: INTERNAL MEDICINE

## 2017-03-06 PROCEDURE — 74011000258 HC RX REV CODE- 258: Performed by: EMERGENCY MEDICINE

## 2017-03-06 PROCEDURE — 77030034850

## 2017-03-06 PROCEDURE — 96365 THER/PROPH/DIAG IV INF INIT: CPT | Performed by: EMERGENCY MEDICINE

## 2017-03-06 PROCEDURE — 71010 XR CHEST PORT: CPT

## 2017-03-06 PROCEDURE — 85610 PROTHROMBIN TIME: CPT | Performed by: INTERNAL MEDICINE

## 2017-03-06 PROCEDURE — 77030021907 HC KT URIN FOL O&M -A

## 2017-03-06 PROCEDURE — 99223 1ST HOSP IP/OBS HIGH 75: CPT | Performed by: INTERNAL MEDICINE

## 2017-03-06 PROCEDURE — 85025 COMPLETE CBC W/AUTO DIFF WBC: CPT | Performed by: INTERNAL MEDICINE

## 2017-03-06 PROCEDURE — 36415 COLL VENOUS BLD VENIPUNCTURE: CPT | Performed by: INTERNAL MEDICINE

## 2017-03-06 PROCEDURE — 93005 ELECTROCARDIOGRAM TRACING: CPT | Performed by: EMERGENCY MEDICINE

## 2017-03-06 PROCEDURE — 99285 EMERGENCY DEPT VISIT HI MDM: CPT | Performed by: EMERGENCY MEDICINE

## 2017-03-06 PROCEDURE — 82962 GLUCOSE BLOOD TEST: CPT

## 2017-03-06 RX ORDER — SODIUM CHLORIDE 9 MG/ML
1000 INJECTION, SOLUTION INTRAVENOUS ONCE
Status: COMPLETED | OUTPATIENT
Start: 2017-03-06 | End: 2017-03-06

## 2017-03-06 RX ORDER — HYDROMORPHONE HYDROCHLORIDE 1 MG/ML
1 INJECTION, SOLUTION INTRAMUSCULAR; INTRAVENOUS; SUBCUTANEOUS
Status: COMPLETED | OUTPATIENT
Start: 2017-03-06 | End: 2017-03-06

## 2017-03-06 RX ORDER — SODIUM CHLORIDE 0.9 % (FLUSH) 0.9 %
5-10 SYRINGE (ML) INJECTION EVERY 8 HOURS
Status: DISCONTINUED | OUTPATIENT
Start: 2017-03-06 | End: 2017-03-08 | Stop reason: HOSPADM

## 2017-03-06 RX ORDER — HYDROMORPHONE HYDROCHLORIDE 1 MG/ML
1 INJECTION, SOLUTION INTRAMUSCULAR; INTRAVENOUS; SUBCUTANEOUS
Status: ACTIVE | OUTPATIENT
Start: 2017-03-06 | End: 2017-03-06

## 2017-03-06 RX ORDER — ACETAMINOPHEN 325 MG/1
650 TABLET ORAL
Status: DISCONTINUED | OUTPATIENT
Start: 2017-03-06 | End: 2017-03-08 | Stop reason: HOSPADM

## 2017-03-06 RX ORDER — GUAIFENESIN 600 MG/1
1200 TABLET, EXTENDED RELEASE ORAL 2 TIMES DAILY
Status: DISCONTINUED | OUTPATIENT
Start: 2017-03-06 | End: 2017-03-08 | Stop reason: HOSPADM

## 2017-03-06 RX ORDER — FAMOTIDINE 10 MG/ML
20 INJECTION INTRAVENOUS EVERY 12 HOURS
Status: DISCONTINUED | OUTPATIENT
Start: 2017-03-06 | End: 2017-03-08 | Stop reason: HOSPADM

## 2017-03-06 RX ORDER — ONDANSETRON 2 MG/ML
4 INJECTION INTRAMUSCULAR; INTRAVENOUS
Status: COMPLETED | OUTPATIENT
Start: 2017-03-06 | End: 2017-03-06

## 2017-03-06 RX ORDER — ENOXAPARIN SODIUM 100 MG/ML
40 INJECTION SUBCUTANEOUS EVERY 24 HOURS
Status: DISCONTINUED | OUTPATIENT
Start: 2017-03-06 | End: 2017-03-06

## 2017-03-06 RX ORDER — HYDROCODONE BITARTRATE AND ACETAMINOPHEN 10; 325 MG/1; MG/1
1 TABLET ORAL
Status: DISCONTINUED | OUTPATIENT
Start: 2017-03-06 | End: 2017-03-08 | Stop reason: HOSPADM

## 2017-03-06 RX ORDER — HYDROMORPHONE HYDROCHLORIDE 1 MG/ML
1 INJECTION, SOLUTION INTRAMUSCULAR; INTRAVENOUS; SUBCUTANEOUS
Status: DISCONTINUED | OUTPATIENT
Start: 2017-03-06 | End: 2017-03-08 | Stop reason: HOSPADM

## 2017-03-06 RX ORDER — SODIUM CHLORIDE 0.9 % (FLUSH) 0.9 %
5-10 SYRINGE (ML) INJECTION EVERY 8 HOURS
Status: DISCONTINUED | OUTPATIENT
Start: 2017-03-06 | End: 2017-03-06 | Stop reason: SDUPTHER

## 2017-03-06 RX ORDER — NALOXONE HYDROCHLORIDE 0.4 MG/ML
0.4 INJECTION, SOLUTION INTRAMUSCULAR; INTRAVENOUS; SUBCUTANEOUS AS NEEDED
Status: DISCONTINUED | OUTPATIENT
Start: 2017-03-06 | End: 2017-03-08 | Stop reason: HOSPADM

## 2017-03-06 RX ORDER — DOCUSATE SODIUM 100 MG/1
100 CAPSULE, LIQUID FILLED ORAL 2 TIMES DAILY
Status: DISCONTINUED | OUTPATIENT
Start: 2017-03-06 | End: 2017-03-08 | Stop reason: HOSPADM

## 2017-03-06 RX ORDER — SODIUM CHLORIDE 9 MG/ML
250 INJECTION, SOLUTION INTRAVENOUS ONCE
Status: COMPLETED | OUTPATIENT
Start: 2017-03-06 | End: 2017-03-06

## 2017-03-06 RX ORDER — SODIUM CHLORIDE 9 MG/ML
50 INJECTION, SOLUTION INTRAVENOUS CONTINUOUS
Status: DISCONTINUED | OUTPATIENT
Start: 2017-03-06 | End: 2017-03-07

## 2017-03-06 RX ORDER — ENOXAPARIN SODIUM 100 MG/ML
40 INJECTION SUBCUTANEOUS EVERY 24 HOURS
Status: DISCONTINUED | OUTPATIENT
Start: 2017-03-07 | End: 2017-03-08 | Stop reason: HOSPADM

## 2017-03-06 RX ORDER — SODIUM CHLORIDE 0.9 % (FLUSH) 0.9 %
5-10 SYRINGE (ML) INJECTION AS NEEDED
Status: DISCONTINUED | OUTPATIENT
Start: 2017-03-06 | End: 2017-03-06 | Stop reason: SDUPTHER

## 2017-03-06 RX ORDER — SODIUM CHLORIDE 0.9 % (FLUSH) 0.9 %
5-10 SYRINGE (ML) INJECTION AS NEEDED
Status: DISCONTINUED | OUTPATIENT
Start: 2017-03-06 | End: 2017-03-08 | Stop reason: HOSPADM

## 2017-03-06 RX ADMIN — SODIUM CHLORIDE 1000 ML: 900 INJECTION, SOLUTION INTRAVENOUS at 05:05

## 2017-03-06 RX ADMIN — Medication 10 ML: at 21:38

## 2017-03-06 RX ADMIN — SODIUM CHLORIDE 1000 ML: 900 INJECTION, SOLUTION INTRAVENOUS at 01:00

## 2017-03-06 RX ADMIN — ONDANSETRON 4 MG: 2 INJECTION INTRAMUSCULAR; INTRAVENOUS at 02:12

## 2017-03-06 RX ADMIN — ACETAMINOPHEN 650 MG: 325 TABLET, FILM COATED ORAL at 04:25

## 2017-03-06 RX ADMIN — SODIUM CHLORIDE 125 ML/HR: 900 INJECTION, SOLUTION INTRAVENOUS at 04:25

## 2017-03-06 RX ADMIN — HYDROCODONE BITARTRATE AND ACETAMINOPHEN 1 TABLET: 10; 325 TABLET ORAL at 11:35

## 2017-03-06 RX ADMIN — DOCUSATE SODIUM 100 MG: 100 CAPSULE, LIQUID FILLED ORAL at 17:28

## 2017-03-06 RX ADMIN — SODIUM CHLORIDE 1000 ML: 900 INJECTION, SOLUTION INTRAVENOUS at 01:42

## 2017-03-06 RX ADMIN — GUAIFENESIN 1200 MG: 600 TABLET, EXTENDED RELEASE ORAL at 08:17

## 2017-03-06 RX ADMIN — METHYLPREDNISOLONE SODIUM SUCCINATE 40 MG: 40 INJECTION, POWDER, FOR SOLUTION INTRAMUSCULAR; INTRAVENOUS at 05:52

## 2017-03-06 RX ADMIN — DOCUSATE SODIUM 100 MG: 100 CAPSULE, LIQUID FILLED ORAL at 08:17

## 2017-03-06 RX ADMIN — Medication 10 ML: at 05:19

## 2017-03-06 RX ADMIN — GUAIFENESIN 1200 MG: 600 TABLET, EXTENDED RELEASE ORAL at 21:07

## 2017-03-06 RX ADMIN — AZITHROMYCIN MONOHYDRATE 500 MG: 500 INJECTION, POWDER, LYOPHILIZED, FOR SOLUTION INTRAVENOUS at 01:05

## 2017-03-06 RX ADMIN — SODIUM CHLORIDE 1000 ML: 900 INJECTION, SOLUTION INTRAVENOUS at 06:50

## 2017-03-06 RX ADMIN — HYDROMORPHONE HYDROCHLORIDE 1 MG: 1 INJECTION, SOLUTION INTRAMUSCULAR; INTRAVENOUS; SUBCUTANEOUS at 02:12

## 2017-03-06 RX ADMIN — CEFTRIAXONE 2 G: 2 INJECTION, POWDER, FOR SOLUTION INTRAMUSCULAR; INTRAVENOUS at 01:05

## 2017-03-06 RX ADMIN — SODIUM CHLORIDE 250 ML: 900 INJECTION, SOLUTION INTRAVENOUS at 01:36

## 2017-03-06 RX ADMIN — HYDROCODONE BITARTRATE AND ACETAMINOPHEN 1 TABLET: 10; 325 TABLET ORAL at 19:25

## 2017-03-06 RX ADMIN — SODIUM CHLORIDE 50 ML/HR: 900 INJECTION, SOLUTION INTRAVENOUS at 17:28

## 2017-03-06 RX ADMIN — FAMOTIDINE 20 MG: 10 INJECTION, SOLUTION INTRAVENOUS at 21:07

## 2017-03-06 RX ADMIN — FAMOTIDINE 20 MG: 10 INJECTION, SOLUTION INTRAVENOUS at 08:18

## 2017-03-06 NOTE — IP AVS SNAPSHOT
Nima Shed 
 
 
 [address] [facility] 
[phone] Patient: Rick Nichole MRN: [id number] NYT:1/73/4386 You are allergic to the following Allergen Reactions Amoxicillin Shortness of Breath Morphine Shortness of Breath Immunizations Administered for This Admission Name Date Influenza Vaccine (Quad) PF  Deferred () Recent Documentation Height Weight Breastfeeding? BMI OB Status Smoking Status 1.575 m 76.2 kg No 30.73 kg/m2 Having regular periods Former Smoker Unresulted Labs Order Current Status LEGIONELLA PNEUMOPHILA AG, URINE  In process CULTURE, BLOOD Preliminary result CULTURE, BLOOD Preliminary result Emergency Contacts Name Discharge Info Relation Home Work Mobile Shae Luna  Child [2] [phone] About your hospitalization You were admitted on:  March 6, 2017 You last received care in the:  [facility] 8 MED SURG You were discharged on:  March 8, 2017 Unit phone number:  [phone] Why you were hospitalized Your primary diagnosis was:  Cap (Community Acquired Pneumonia) Your diagnoses also included:  Leukocytosis, Sepsis (Hcc), Chest Pain, Tachycardia, Elevated Lactic Acid Level, Hypoxemia Providers Seen During Your Hospitalizations Provider Role Specialty Primary office phone Eda Dakins, MD Attending Provider Emergency Medicine [phone] Peng Villarreal MD Attending Provider Pulmonary Disease [phone] Your Primary Care Physician (PCP) Primary Care Physician Office Phone Office Fax [address] [phone] Follow-up Information Follow up With Details Comments Contact Info Jayleen Carter MD   [address] [place] [id number] [phone] Current Discharge Medication List  
  
 START taking these medications Dose & Instructions Dispensing Information Comments Morning Noon Evening Bedtime  
 ciprofloxacin HCl 500 mg tablet Commonly known as:  CIPRO Your next dose is: Today, Tomorrow Other:  _________ Dose:  500 mg Take 1 Tab by mouth every twelve (12) hours for 6 days. Quantity:  12 Tab Refills:  0 HYDROcodone-acetaminophen 5-325 mg per tablet Commonly known as:  Lizabeth Oyster Your next dose is: Today, Tomorrow Other:  _________ Dose:  1 Tab Take 1 Tab by mouth every six (6) hours as needed for Pain. Max Daily Amount: 4 Tabs. Quantity:  10 Tab Refills:  0 CONTINUE these medications which have NOT CHANGED Dose & Instructions Dispensing Information Comments Morning Noon Evening Bedtime  
 guaiFENesin 1,200 mg Ta12 ER tablet Commonly known as:  Ricky & Ricky Your next dose is: Today, Tomorrow Other:  _________ Dose:  1200 mg Take 1 Tab by mouth two (2) times a day. Quantity:  28 Tab Refills:  0 ASK your doctor about these medications Dose & Instructions Dispensing Information Comments Morning Noon Evening Bedtime  
 predniSONE 10 mg tablet Commonly known as:  [employer] Your next dose is: Today, Tomorrow Other:  _________ Dose:  10 mg Take 1 Tab by mouth daily (with breakfast). 5x3d, 4x3d, 3x3d, 2x3d, 1x3d Quantity:  45 Tab Refills:  0 Where to Get Your Medications Information on where to get these meds will be given to you by the nurse or doctor. ! Ask your nurse or doctor about these medications  
  ciprofloxacin HCl 500 mg tablet HYDROcodone-acetaminophen 5-325 mg per tablet Discharge Instructions DISCHARGE SUMMARY from Nurse The following personal items are in your possession at time of discharge: Dental Appliances: None Visual Aid: Glasses Home Medications: None Jewelry: Ring (rings x 2) Clothing: Dorma Greet Other Valuables: Cell Phone PATIENT INSTRUCTIONS: 
 
 
F-face looks uneven A-arms unable to move or move unevenly S-speech slurred or non-existent T-time-call 911 as soon as signs and symptoms begin-DO NOT go Back to bed or wait to see if you get better-TIME IS BRAIN. Warning Signs of HEART ATTACK Call 911 if you have these symptoms: 
? Chest discomfort. Most heart attacks involve discomfort in the center of the chest that lasts more than a few minutes, or that goes away and comes back. It can feel like uncomfortable pressure, squeezing, fullness, or pain. ? Discomfort in other areas of the upper body. Symptoms can include pain or discomfort in one or both arms, the back, neck, jaw, or stomach. ? Shortness of breath with or without chest discomfort. ? Other signs may include breaking out in a cold sweat, nausea, or lightheadedness. Don't wait more than five minutes to call [address]! Fast action can save your life. Calling 911 is almost always the fastest way to get lifesaving treatment. Emergency Medical Services staff can begin treatment when they arrive  up to an hour sooner than if someone gets to the hospital by car. The discharge information has been reviewed with the patient. The patient verbalized understanding. Discharge medications reviewed with the patient and appropriate educational materials and side effects teaching were provided. Discharge Instructions Attachments/References PNEUMONIA (Thai) Discharge Orders None  
  
[place] Announcement We are excited to announce that we are making your provider's discharge notes available to you in [employer]. You will see these notes when they are completed and signed by the physician that discharged you from your recent hospital stay. If you have any questions or concerns about any information you see in [employer], please call the Health Information Department where you were seen or reach out to your Primary Care Provider for more information about your plan of care. Introducing [facility]! [facility] introduce portal paciente [facility] . Ahora se puede acceder a partes de lee expediente médico, enviar por correo electrónico la oficina de lee médico y solicitar renovaciones de medicamentos en línea. En lee navegador de Internet , Sanjay Edmond a [email or web address] Lily clic en el usuario por Brett Anand? Sandy Kim clic aquí en la sesión Fanta Fariba. Verá la página de registro [place]. Ingrese lee código de [facility] aliyah y negrito aparece a continuación. Rashmi no tendrá que [employer] código después de bri completado el proceso de registro . Si usted no se inscribe antes de la fecha de caducidad , debe solicitar un nuevo código. · [facility] Código de acceso : [id number] Expires: 4/9/2017  7:56 PM 
 
Ingresa los últimos cuatro dígitos de lee Número de Seguro Social ( xxxx ) y fecha de nacimiento ( dd / mm / aaaa ) negrito se indica y lily clic en Enviar. Rashmi será llevado a la siguiente página de registro . Crear un ID MyChart . Esta será lee ID de inicio de sesión de MyChart y no puede ser [place] , por lo que pensar en charis que es Kim Shumway y fácil de recordar . Crear charis contraseña [facility] . Rashmi puede cambiar lee contraseña en cualquier momento . Ingrese lee Password Reset de preguntas y [place] . [place] se puede utilizar en un momento posterior si usted olvida lee contraseña.  
Introduzca lee dirección de correo electrónico . Trace Lazo recibirá [facility] notificación por correo electrónico cuando la nueva información está disponible en [employer] . Terra adrian en Registrarse. Clearance Sitter vickie y descargar porciones de lee expediente médico. 
Juliet adrian en el enlace de descarga del menú Resumen para descargar charis copia portátil de lee información médica . Si tiene Vanessa [employer] , por favor visite la sección de preguntas frecuentes del sitio web LocalCustomerhart . Recuerde, [employer] NO es que se utilizará para las necesidades urgentes. Para emergencias médicas , llame al 911 . Ahora disponible en lee iPhone y Android ! General Information Please provide this summary of care documentation to your next provider. Patient Signature:  ____________________________________________________________ Date:  ____________________________________________________________  
  
Valarie John Provider Signature:  ____________________________________________________________ Date:  ____________________________________________________________ More Information Pneumonia: Care Instructions Your Care Instructions Pneumonia is an infection of the lungs. Most cases are caused by infections from bacteria or viruses. Pneumonia may be mild or very severe. If it is caused by bacteria, you will be treated with antibiotics. It may take a few weeks to a few months to recover fully from pneumonia, depending on how sick you were and whether your overall health is good. Follow-up care is a key part of your treatment and safety. Be sure to make and go to all appointments, and call your doctor if you are having problems. Its also a good idea to know your test results and keep a list of the medicines you take. How can you care for yourself at home? · Take your antibiotics exactly as directed. Do not stop taking the medicine just because you are feeling better. You need to take the full course of antibiotics. · Take your medicines exactly as prescribed. Call your doctor if you think you are having a problem with your medicine. · Get plenty of rest and sleep. You may feel weak and tired for a while, but your energy level will improve with time. · To prevent dehydration, drink plenty of fluids, enough so that your urine is light yellow or clear like water. Choose water and other caffeine-free clear liquids until you feel better. If you have kidney, heart, or liver disease and have to limit fluids, talk with your doctor before you increase the amount of fluids you drink. · Take care of your cough so you can rest. A cough that brings up mucus from your lungs is common with pneumonia. It is one way your body gets rid of the infection. But if coughing keeps you from resting or causes severe fatigue and chest-wall pain, talk to your doctor. He or she may suggest that you take a medicine to reduce the cough. · Use a vaporizer or humidifier to add moisture to your bedroom. Follow the directions for cleaning the machine. · Do not smoke or allow others to smoke around you. Smoke will make your cough last longer. If you need help quitting, talk to your doctor about stop-smoking programs and medicines. These can increase your chances of quitting for good. · Take an over-the-counter pain medicine, such as acetaminophen (Tylenol), ibuprofen (Advil, Motrin), or naproxen (Aleve). Read and follow all instructions on the label. · Do not take two or more pain medicines at the same time unless the doctor told you to. Many pain medicines have acetaminophen, which is Tylenol. Too much acetaminophen (Tylenol) can be harmful. · If you were given a spirometer to measure how well your lungs are working, use it as instructed. This can help your doctor tell how your recovery is going.  
· To prevent pneumonia in the future, talk to your doctor about getting a flu vaccine (once a year) and a pneumococcal vaccine (one time only for most people). When should you call for help? Call 911 anytime you think you may need emergency care. For example, call if: 
· You have severe trouble breathing. Call your doctor now or seek immediate medical care if: 
· You cough up dark brown or bloody mucus (sputum). · You have new or worse trouble breathing. · You are dizzy or lightheaded, or you feel like you may faint. Watch closely for changes in your health, and be sure to contact your doctor if: 
· You have a new or higher fever. · You are coughing more deeply or more often. · You are not getting better after 2 days (48 hours). · You do not get better as expected. Where can you learn more? Go to http://yasmin-carly.info/. Enter [id number] in the search box to learn more about \"Pneumonia: Care Instructions. \" Current as of: May 23, 2016 Content Version: 11.1 © 3563-3097 [employer]. Care instructions adapted under license by [employer] (which disclaims liability or warranty for this information). If you have questions about a medical condition or this instruction, always ask your healthcare professional. [place] [address] any warranty or liability for your use of this information. Elena Sarabia 
 
 
 [address] [place] [address] 
[phone] Patient: Sindi Albrecht MRN: [id number] YWL:[birth year] Tiene alergias a lo siguiente Cata Harry Amoxicillin Shortness of Breath Morphine Shortness of Breath Ciarra Sharp Administradas en esta admisión:    
   
 Mello Russ Influenza Vaccine (Quad) PF  Deferred () Documentación recientes Height Weight Está amamantando? BMI [facility]) Estado obstétrico Estatus de tabaquísmo 1.575 m 76.2 kg No 30.73 kg/m2 Having regular periods Former Smoker Unresulted Labs Order Current Status LEGIONELLA PNEUMOPHILA AG, URINE  In process CULTURE, BLOOD Preliminary result CULTURE, BLOOD Preliminary result Emergency Contacts Name Discharge Info Relation Home Work Mobile Shae Luna  Child [2] [phone] Sobre saenz hospitalización Le admitieron el:  March 6, 2017 Saenz tratamiento más reciente fue el:  [facility] 8 MED SURG Le dieron de andrés el:  March 8, 2017 Número de teléfono de la unidad:  [phone] Por qué le ingresaron Saenz diagnosis primaria es:  Cap (Community Acquired Pneumonia) Saenz diagnosis también incluye:  Leukocytosis, Sepsis (Hcc), Chest Pain, Tachycardia, Elevated Lactic Acid Level, Hypoxemia Proveedores de verse alessandra shivani hospitalizaciones Personal Médico Rol Especialidad Teléfono principal de la oficina Shaun Marti MD Attending Provider Emergency Medicine [phone] Paris Fournier MD Attending Provider Pulmonary Disease [phone] Saenz médico de atención primaria (PCP ) Primary Care Physician Office Phone Office Fax [address] [phone] Follow-up Information Follow up With Details Comments Contact Info Kenan Lenz MD   [address] [place] [phone] Aprobación de la gestión actual lista de medicamentos EMPIECE a lucero AnchorFree Dosis e instrucciones Información de dispensación Comentarios Morning Noon Evening Bedtime  
 ciprofloxacin HCl 500 mg tablet También conocido negrito:  CIPRO Your next dose is: Today, Tomorrow Other:  _________ Dosis:  500 mg Take 1 Tab by mouth every twelve (12) hours for 6 days. cantidad:  12 Tab  
recargas:  0 HYDROcodone-acetaminophen 5-325 mg per tablet También conocido negrito:  Audie Hammonds Your next dose is: Today, Tomorrow Other:  _________ Dosis:  1 Tab Take 1 Tab by mouth every six (6) hours as needed for Pain. Max Daily Amount: 4 Tabs. cantidad:  10 Tab  
recargas:  0 CONTINUAR estos medicamentos que no [place] Dosis e instrucciones Información de dispensación Comentarios Morning Noon Evening Bedtime  
 guaiFENesin 1,200 mg Ta12 ER tablet También conocido negrito:  Ricky & Ricky Your next dose is: Today, Tomorrow Other:  _________ Dosis:  1200 mg Take 1 Tab by mouth two (2) times a day. cantidad:  28 Tab  
recargas:  0  
     
   
   
   
  
  
CONSULTE con lee médico sobre Impulsiv Dosis e instrucciones Información de dispensación Comentarios Morning Noon Evening Bedtime  
 predniSONE 10 mg tablet También conocido negrito:  Jovana Cho Your next dose is: Today, Tomorrow Other:  _________ Dosis:  10 mg Take 1 Tab by mouth daily (with breakfast). 5x3d, 4x3d, 3x3d, 2x3d, 1x3d  
 cantidad:  45 Tab  
recargas:  0 Dónde puede recoger shivani medicamentos Información sobre dónde obtener estos medicamentos se le dará a usted por la enfermera o el médico.   
 ! Pregunte a lee médico o enfermero/a sobre estos medicamentos  
  ciprofloxacin HCl 500 mg tablet HYDROcodone-acetaminophen 5-325 mg per tablet Discharge Instructions DISCHARGE SUMMARY from Nurse The following personal items are in your possession at time of discharge: 
 
Dental Appliances: None Visual Aid: Glasses Home Medications: None Jewelry: Ring (rings x 2) Clothing: [id number] Other Valuables: Cell Phone PATIENT INSTRUCTIONS: 
 
 
F-face looks uneven A-arms unable to move or move unevenly S-speech slurred or non-existent T-time-call 911 as soon as signs and symptoms begin-DO NOT go Back to bed or wait to see if you get better-TIME IS BRAIN. Warning Signs of HEART ATTACK Call 911 if you have these symptoms: 
? Chest discomfort. Most heart attacks involve discomfort in the center of the chest that lasts more than a few minutes, or that goes away and comes back. It can feel like uncomfortable pressure, squeezing, fullness, or pain. ? Discomfort in other areas of the upper body. Symptoms can include pain or discomfort in one or both arms, the back, neck, jaw, or stomach. ? Shortness of breath with or without chest discomfort. ? Other signs may include breaking out in a cold sweat, nausea, or lightheadedness. Don't wait more than five minutes to call [address]! Fast action can save your life. Calling 911 is almost always the fastest way to get lifesaving treatment. Emergency Medical Services staff can begin treatment when they arrive  up to an hour sooner than if someone gets to the hospital by car. The discharge information has been reviewed with the patient. The patient verbalized understanding. Discharge medications reviewed with the patient and appropriate educational materials and side effects teaching were provided. Discharge Instructions Attachments/References PNEUMONIA (Azeri) Discharge Orders [employer] Announcement We are excited to announce that we are making your provider's discharge notes available to you in [employer]. You will see these notes when they are completed and signed by the physician that discharged you from your recent hospital stay.   If you have any questions or concerns about any information you see in [employer], please call the Health Information Department where you were seen or reach out to your Primary Care Provider for more information about your plan of care. Introducing [facility]! [facility] introduce portal paciente MyChart . Ahora se puede acceder a partes de lee expediente médico, enviar por correo electrónico la oficina de lee médico y solicitar renovaciones de medicamentos en línea. En lee navegador de Internet , Rubi Campoverde a [email or web address] Lily clic en el usuario por Tyrone Tomas? Alba Gasmen clic aquí en la sesión [employer] Rg. Verá la página de registro [place]. Ingrese lee código de [employer] aliyah y negrito aparece a continuación. Usted no tendrá que [employer] código después de bri completado el proceso de registro . Si usted no se inscribe antes de la fecha de caducidad , debe solicitar un nuevo código. · MyChart Código de acceso : [id number] Expires: 4/9/2017  7:56 PM 
 
Ingresa los últimos cuatro dígitos de lee Número de Seguro Social ( xxxx ) y fecha de nacimiento ( dd / mm / aaaa ) negrito se indica y lily clic en Enviar. Usted será llevado a la siguiente página de registro . Crear un ID MyChart . Esta será lee ID de inicio de sesión de MyChart y no puede ser [place] , por lo que pensar en charis que es [place] y fácil de recordar . Crear charis contraseña MyChart . Usted puede cambiar lee contraseña en cualquier momento . Ingrese lee Password Reset de preguntas y [place] . [place] se puede utilizar en un momento posterior si usted olvida lee contraseña. Introduzca lee dirección de correo electrónico . Julio Kidney recibirá charis notificación por correo electrónico cuando la nueva información está disponible en MyChart . Luigi adrian en Registrarse. Misael Stanley vickie y descargar porciones de lee expediente médico. 
Lily kaylah en el enlace de descarga del menú Resumen para descargar charis copia portátil de lee información médica . Si tiene [employer]essa [employer] , por favor visite la sección de preguntas frecuentes del sitio web MyChart .  Recuerde, MyChart NO es que se utilizará para las [employer]. Para emergencias médicas , llame al 911 . Ahora disponible en lee iPhone y Android ! General Information Please provide this summary of care documentation to your next provider. Patient Signature:  ____________________________________________________________ Date:  ____________________________________________________________  
  
Marycruz Sharifa Provider Signature:  ____________________________________________________________ Date:  ____________________________________________________________ More Information Neumonía: Instrucciones de cuidado - [ Pneumonia: Care Instructions ] Instrucciones de cuidado La neumonía es charis infección de los pulmones. [place] de los casos son causados por infecciones debidas a bacterias o virus. La neumonía puede ser leve o muy grave. Si es causada por bacterias, será tratado con antibióticos. La recuperación completa de la neumonía podría lucero [employer] o algunos meses, dependiendo de qué tan enfermo estuvo y si lee estado general de jelly es june. La atención de seguimiento es charis parte clave de lee tratamiento y seguridad. Asegúrese de hacer y acudir a todas las citas, y llame a lee médico si está teniendo problemas. También es charis buena idea saber los resultados de los exámenes y mantener charis lista de los medicamentos que eulalia. Cómo puede cuidarse en el hogar? · [address] indicaciones. No deje de tomarlos por el hecho de sentirse mejor. Debe lucero todos los antibióticos hasta terminarlos. · [employer] medicamentos exactamente negrito le fueron recetados. Llame a lee médico si desiree estar teniendo problemas con lee medicamento. · Descanse y duerma lo suficiente. Podría sentirse cansado y débil alessandra un [place], evans lee nivel de energía mejorará con [place].  
· Para prevenir la deshidratación, aleksandar abundantes líquidos, los suficientes para que lee orina sea de color amarillo pramod o jered negrito el agua. Elija agua y otros líquidos ashley sin cafeína hasta que se sienta mejor. Si tiene charis enfermedad del riñón, del corazón o del hígado y tiene que [place]'s líquidos, hable con lee médico antes de aumentar lee consumo. · Trate la tos para que pueda descansar. La tos con mucosidad (flema) de los pulmones es común con la neumonía. Es charis de las [employer] que lee organismo [address] Sasha si la tos le impide descansar o le causa gran fatiga y dolor en la pared torácica, hable con lee médico. Podría sugerirle que tome un medicamento para aliviar la tos. · Utilice un vaporizador o humidificador para añadir humedad en lee habitación. Siga las indicaciones para limpiar el aparato. · No fume ni permita que otras personas fumen cerca de usted. Fumar hará que la tos dure [place]. Si necesita ayuda para dejar de fumar, hable con lee médico [employer] y medicamentos para dejar de fumar. Éstos pueden aumentar shivani probabilidades de dejar el hábito para siempre. · [place] un analgésico (medicamento para el dolor) de venta ozzy, negrito acetaminofén (Tylenol), ibuprofeno (Advil, Motrin) o naproxeno (Aleve). Kait y siga todas las instrucciones de la [place]. · No tome dos o más analgésicos al mismo tiempo a menos que el médico se lo haya indicado. Muchos analgésicos contienen acetaminofén, es decir, Tylenol. El exceso de acetaminofén (Tylenol) puede ser dañino. · Si le dieron un espirómetro para medir qué tan jen están funcionando shivani pulmones, utilícelo negrito se lo indicaron. [place] puede ayudar a lee médico a saber cómo va lee recuperación. · Para prevenir la neumonía en el futuro, hable con lee médico acerca de vacunarse contra la gripe (charis vez al año) y Selene Georgis antineumocócica (sólo charis vez para la [address]). Cuándo debe pedir ayuda?  
Llame al 911 en cualquier momento que considere que necesita atención de emergencia. Por ejemplo, llame si: · [address] dificultades para respirar. Llame a lee médico ahora mismo o busque atención médica inmediata si: 
· Tose mucosidad (esputo) de color café oscuro o con lola. · Tiene nueva o peor dificultad para respirar. · Siente mareos o aturdimiento, o que está a punto de [place]. Preste especial atención a los cambios en lee jelly y asegúrese de comunicarse con lee médico si: 
· Presenta fiebre o la fiebre es más andrés. · Lee tos es más profunda o más frecuente que antes. · No está mejorando después de 2 días (48 horas). · No mejora negrito se esperaba. Dónde puede encontrar más información en inglés? Geetha Troncoso a http://yasmin-carly.info/. Rehan Booth K646 en la búsqueda para aprender más acerca de \"Neumonía: Instrucciones de cuidado - [ Pneumonia: Care Instructions ]. \" 
Revisado: [address] [place], 2016 Versión del contenido: 11.1 © 9901-0274 [employer]. Las instrucciones de cuidado fueron adaptadas bajo licencia por [employer] (which disclaims liability or warranty for this information). Si usted tiene [place] afección médica o sobre estas instrucciones, siempre pregunte a lee profesional de jelly. Healthwise, Incorporated niega toda garantía o responsabilidad por lee uso de esta información.

## 2017-03-06 NOTE — PROGRESS NOTES
Modest improvement in BP, 3rd liter bolus still infusing. Patient much more restful with eyes closed, even non-labored respirations. Still alert/oriented when prompted. Alejandro placed on order- modest UOP of 95ml. Per ER RN, patient did not void while in the Emergency Dept.

## 2017-03-06 NOTE — PROGRESS NOTES
Rounded with nurse, Interpreting Services offered when needed.     [address]  [phone]

## 2017-03-06 NOTE — PROGRESS NOTES
TRANSFER - IN REPORT:    Verbal report received from ARCHANA Cuevas(name) on Deny Huddle  being received from ER(unit) for routine progression of care      Report consisted of patients Situation, Background, Assessment and   Recommendations(SBAR). Information from the following report(s) SBAR, Kardex, ED Summary, Procedure Summary, Intake/Output, MAR, Recent Results and Cardiac Rhythm S. Tach was reviewed with the receiving nurse. Opportunity for questions and clarification was provided. Assessment completed upon patients arrival to unit and care assumed.

## 2017-03-06 NOTE — PROGRESS NOTES
Patient with persistent hypotension. Order from [facility] to infuse another liter, this at 500ml/hr and start low dose levophed. Patient remains alert/oriented. .

## 2017-03-06 NOTE — H&P
HISTORY AND PHYSICAL      Angeles Shaw    3/6/2017    Date of Admission:  3/6/2017    The patient's chart is reviewed and the patient is discussed with the staff. Subjective: The patient is a 51 y.o.  female who was here 2 days ago and found to have a LLL pneumonia. She was prescribed a Zpak but did not have the money to afford it. She has worsened and now presents with increased dyspnea, hemoptysis, and severe chest pain. She is also very tachycardic but has a stable BP. Her CXR shows some progression. She is now admitted to the ICU for pneumonia with sepsis and could have empyema given the severity of pain. Her LA is 2.0 and WBC 17.1. She is not having diffuse pain and her influenza swab was negative 2 days ago. It will be repeated. Review of Systems    Denies: anorexia, weight loss   Denies: blurry vision, loss of vision, eye pain, photophobia  Denies: hearing loss, ringing in the ears, earache, epistaxis  Denies: chest pain, palpitations, syncope, orthopnea, paroxysmal nocturnal dyspnea, claudication  Denies: dysphagia, odynophagia, vomiting, diarrhea, constipation, abdominal pain, jaundice, melena   Denies: frequency, dysuria, nocturia, urinary incontinence, stones, hematuria  Denies: polydipsia/polyuria, skin changes, temperature intolerance, unexpected weight gain  Denies: back pain, joint pain, joint swelling, muscle pain, muscle weakness  Denies: bleeding problems, blood transfusions, bruising, pallor, swollen lymph nodes  Denies: headache, dysarthria, blurred vision, diplopia,seizure, focal deficits.     Admits to: fevers, chills, sweats, fatigue, malaise, lower L chest pain, nausea, and hemoptysis        Patient Active Problem List   Diagnosis Code    HTN (hypertension) I10    CAP (community acquired pneumonia) J18.9    Leukocytosis D72.829    Sepsis ([facility] [address] A41.9    Chest pain- L pleuritic R07.9    Tachycardia R00.0    Elevated lactic acid level E87.2       Prior to Admission Medications   Prescriptions Last Dose Informant Patient Reported? Taking? albuterol (PROVENTIL HFA, VENTOLIN HFA, PROAIR HFA) 90 mcg/actuation inhaler   No No   Sig: Take 2 Puffs by inhalation every six (6) hours as needed for Wheezing. Please instruct on use and dispense with a spacer. azithromycin (ZITHROMAX Z-OZZIE) 250 mg tablet   No No   Si tabs now and one tab a day for 4 days   benzonatate (TESSALON) 200 mg capsule   No No   Sig: Take 1 Cap by mouth three (3) times daily as needed for Cough. cyclobenzaprine (FLEXERIL) 10 mg tablet   No No   Sig: Take 1 tablet by mouth three (3) times daily as needed for Muscle Spasm(s). guaiFENesin (MUCINEX) 1,200 mg Ta12 ER tablet   No No   Sig: Take 1 Tab by mouth two (2) times a day. predniSONE (DELTASONE) 10 mg tablet   No No   Sig: Take 1 Tab by mouth daily (with breakfast). 5x3d, 4x3d, 3x3d, 2x3d, 1x3d      Facility-Administered Medications: None       Past Medical History:   Diagnosis Date    HTN (hypertension) 2013    Hypertension     no meds    Missed ab     6wks    Other ill-defined conditions(799.89)     kidney stones    Sepsis ([facility] [address] 3/6/2017     History reviewed. No pertinent surgical history. Social History     Social History    Marital status: SINGLE     Spouse name: N/A    Number of children: N/A    Years of education: N/A     Occupational History    Not on file. Social History Main Topics    Smoking status: Former Smoker    Smokeless tobacco: Never Used    Alcohol use No    Drug use: No    Sexual activity: No     Other Topics Concern    Not on file     Social History Narrative     History reviewed. No pertinent family history.   Allergies   Allergen Reactions    Amoxicillin Shortness of Breath    Morphine Shortness of Breath       Current Facility-Administered Medications   Medication Dose Route Frequency    sodium chloride (NS) flush 5-10 mL  5-10 mL IntraVENous Q8H    sodium chloride (NS) flush 5-10 mL  5-10 mL IntraVENous PRN    cefTRIAXone (ROCEPHIN) 2 g in 0.9% sodium chloride (MBP/ADV) 50 mL  2 g IntraVENous Q24H    azithromycin (ZITHROMAX) 500 mg in 0.9% sodium chloride (MBP/ADV) 250 mL  500 mg IntraVENous Q24H     Current Outpatient Prescriptions   Medication Sig    predniSONE (DELTASONE) 10 mg tablet Take 1 Tab by mouth daily (with breakfast). 5x3d, 4x3d, 3x3d, 2x3d, 1x3d    guaiFENesin (MUCINEX) 1,200 mg Ta12 ER tablet Take 1 Tab by mouth two (2) times a day.  benzonatate (TESSALON) 200 mg capsule Take 1 Cap by mouth three (3) times daily as needed for Cough.  albuterol (PROVENTIL HFA, VENTOLIN HFA, PROAIR HFA) 90 mcg/actuation inhaler Take 2 Puffs by inhalation every six (6) hours as needed for Wheezing. Please instruct on use and dispense with a spacer.  azithromycin (ZITHROMAX Z-OZZIE) 250 mg tablet 2 tabs now and one tab a day for 4 days    cyclobenzaprine (FLEXERIL) 10 mg tablet Take 1 tablet by mouth three (3) times daily as needed for Muscle Spasm(s). Objective:     Vitals:    03/06/17 0029 03/06/17 0031 03/06/17 0035 03/06/17 0040   BP:   139/73    Pulse:  (!) 141 (!) 138    Resp:  24 17    Temp: 99.7 °F (37.6 °C)  99.7 °F (37.6 °C)    SpO2:   95% 99%   Weight: 150 lb (68 kg)  150 lb (68 kg)    Height: 5' 2\" (1.575 m)  5' 2\" (1.575 m)        PHYSICAL EXAM     Constitutional:  the patient is well developed and in acute distress  EENMT:  Sclera clear, pupils equal, oral mucosa moist  Respiratory: decreased BS   Cardiovascular:  Tachy RRR without M,G,R  Gastrointestinal: soft and non-tender; with positive bowel sounds. Musculoskeletal: warm without cyanosis. There is no lower leg edema.   Skin:  no jaundice or rashes  Neurologic: no gross neuro deficits     Psychiatric:  alert and oriented x 3    CXR:          Recent Labs      03/06/17   0042   WBC  17.1*   HGB  10.6*   HCT  33.8*   PLT  286     Recent Labs      03/06/17 0042   NA  140   K  3.8   CL  105   GLU  94 CO2  23   BUN  9   CREA  0.94   CA  8.6   ALB  3.2*   TBILI  0.7   ALT  13   SGOT  10*     No results for input(s): PH, PCO2, PO2, HCO3 in the last 72 hours. No results for input(s): LCAD, LAC in the last 72 hours. PCT 0.5    Assessment:  (Medical Decision Making)     Hospital Problems  Date Reviewed: 3/6/2017          Codes Class Noted POA    CAP (community acquired pneumonia) ICD-10-CM: J18.9  ICD-9-CM: 486  3/6/2017 Unknown    Appears to be severe with sepsis. Will start IV antibiotics and steroids. CP is worrisome for a possible earlier empyema. Leukocytosis ICD-10-CM: T38.956  ICD-9-CM: 288.60  3/6/2017 Unknown    Follow response to antibiotics    Sepsis ([facility] [address] ICD-10-CM: A41.9  ICD-9-CM: 038.9, 995.91  3/6/2017 Unknown        Chest pain- L pleuritic ICD-10-CM: R07.9  ICD-9-CM: 786.50  3/6/2017 Unknown    May be developing an empyema. Effusion not clearly evident on CXR. Tachycardia ICD-10-CM: R00.0  ICD-9-CM: 785.0  3/6/2017 Unknown    Severe    Elevated lactic acid level ICD-10-CM: E87.2  ICD-9-CM: 276.2  3/6/2017 Unknown    Follow           Plan:  (Medical Decision Making)     --Will admit for to ICU further medical management  --Supplemental O2 as needed  --Respiratory nebulizer treatments  --culture  --steroid therapy  --antibiotic therapy with Rocephin and Zmax  --pain control  --Follow CXR's. If pain does not improve, she will need a CT to assess for empyema which could be loculated. --Repeat PCT tomorrow  --Check legionella UAg    More than 50% of the time documented was spent in face-to-face contact with the patient and in the care of the patient on the floor/unit where the patient is located.     Nitish Amaya MD

## 2017-03-06 NOTE — PROGRESS NOTES
present with Qamar Hoffman RN    Thank you for this referral,      Bettye Vernon, [address] [facility]  /Patient [address] .  [facility], [facility] [address] [facility]    [phone]

## 2017-03-06 NOTE — ED TRIAGE NOTES
Ems called out for back pain and sob.   Patient seen in er yesterday for same symptoms given abt but doesn't have the money to get abt

## 2017-03-06 NOTE — PROGRESS NOTES
Patient received from ER. Patient alert/oriented, assistance from [facility] [place] [place] . Patient reports back pain 10/10, worse with coughing. Patient with productive cough, pink tinged and thick. Dual skin assessment performed with ARCHANA Billings. No areas of major irritation noted. Patient with tattoos, ears pierced with no earrings present. Posterior w/o any breakdown, allyvn applied. Patient oriented to call light. Patient requests water- advised of NPO status until AM.  Patient given small amount of water to take tylenol with.

## 2017-03-06 NOTE — PROGRESS NOTES
Patient with progressing hypotension. HR in 110s/120s. Patient remains alert/oriented, no mentation changes. Patient with no urine produced since arrival. Spoke with MD Ruby Mahmood. Order for 1 liter NS bolus, martinez cath for strict I&Os. Procedure explained to patient. RN gokul to place.

## 2017-03-06 NOTE — PROGRESS NOTES
Miles Kruse  Admission Date: 3/6/2017             Daily Progress Note: 3/6/2017   The patient is a 51 y.o.  female who was here 2 days ago and found to have a LLL pneumonia. She was prescribed a Zpak but did not have the money to afford it. She has worsened and now presents with increased dyspnea, hemoptysis, and severe chest pain. She is also very tachycardic but has a stable BP. Her CXR shows some progression. She is now admitted to the ICU for pneumonia with sepsis and could have empyema given the severity of pain. Her LA is 2.0 and WBC 17.1. She is not having diffuse pain and her influenza swab was negative 2 days ago. Repeat flu negative. .    Subjective:     Blood pressure 100/71 (MAP 78)  Pain is better with dilaudid     Review of Systems  Respiratory: positive for cough, sputum or pleurisy/chest pain    Current Facility-Administered Medications   Medication Dose Route Frequency    sodium chloride (NS) flush 5-10 mL  5-10 mL IntraVENous PRN    cefTRIAXone (ROCEPHIN) 2 g in 0.9% sodium chloride (MBP/ADV) 50 mL  2 g IntraVENous Q24H    azithromycin (ZITHROMAX) 500 mg in 0.9% sodium chloride (MBP/ADV) 250 mL  500 mg IntraVENous Q24H    guaiFENesin ER (MUCINEX) tablet 1,200 mg  1,200 mg Oral BID    sodium chloride (NS) flush 5-10 mL  5-10 mL IntraVENous Q8H    naloxone (NARCAN) injection 0.4 mg  0.4 mg IntraVENous PRN    0.9% sodium chloride infusion  125 mL/hr IntraVENous CONTINUOUS    acetaminophen (TYLENOL) tablet 650 mg  650 mg Oral Q4H PRN    HYDROcodone-acetaminophen (NORCO)  mg tablet 1 Tab  1 Tab Oral Q4H PRN    HYDROmorphone (PF) (DILAUDID) injection 1 mg  1 mg IntraVENous Q4H PRN    docusate sodium (COLACE) capsule 100 mg  100 mg Oral BID    methylPREDNISolone (PF) (SOLU-MEDROL) injection 40 mg  40 mg IntraVENous Q8H    famotidine (PF) (PEPCID) injection 20 mg  20 mg IntraVENous Q12H    insulin regular (NOVOLIN R, HUMULIN R) injection   SubCUTAneous AC&HS    HYDROmorphone (PF) (DILAUDID) injection 1 mg  1 mg IntraVENous NOW    [START ON 3/7/2017] enoxaparin (LOVENOX) injection 40 mg  40 mg SubCUTAneous Q24H    influenza vaccine 2016-17 (36mos+)(PF) (FLUZONE/FLUARIX/FLULAVAL QUAD) injection 0.5 mL  0.5 mL IntraMUSCular PRIOR TO DISCHARGE    NOREPINephrine (LEVOPHED) 4,000 mcg in dextrose 5% 250 mL infusion  2-16 mcg/min IntraVENous TITRATE         Objective:     Vitals:    03/06/17 0815 03/06/17 0830 03/06/17 0845 03/06/17 0900   BP: (!) 81/55 93/64 (!) 82/60 93/60   Pulse: 94 92 89 92   Resp: 15 19 20 20   Temp:       SpO2: 100% 100% 99% 99%   Weight:       Height:         Intake and Output:   03/04 1901 - 03/06 0700  In: 1500 [I.V.:1500]  Out: 95 [Urine:95]       Physical Exam:          GEN: well developed and in no acute distress, Oxygen per NC  HEENT: no alar flaring or epistaxis, oral mucosa moist without cyanosis,   NECK:  no JVD, no retractions, no thyromegaly or masses,   LUNGS:  Decreased left base posteriorly, + productive sputum   HEART:  RRR with no M,G,R;  ABDOMEN:  soft with no tenderness; positive bowel sounds present  EXTREMITIES:  warm with no cyanosis,  no lower leg edema  SKIN:  no jaundice or ecchymosis   NEURO:  alert and oriented X 3  Lines/Drains: IV  Nutrition: regular    CHEST XRAY:     LAB  Recent Labs      03/06/17   0600 03/06/17   0042   WBC  17.3*  17.1*   HGB  7.6*  10.6*   HCT  25.6*  33.8*   PLT  217  286     Recent Labs      03/06/17   0600  03/06/17   0042   NA   --   140   K   --   3.8   CL   --   105   CO2   --   23   GLU   --   94   BUN   --   9   CREA   --   0.94   INR  1.1   --      Recent Labs      03/06/17   0242   PH  7.40   PCO2  34*   PO2  61*   HCO3  20*       Assessment:     Patient Active Problem List   Diagnosis Code    HTN (hypertension) I10    CAP (community acquired pneumonia) J18.9    Leukocytosis D72.829    Sepsis (HCC) A41.9    Chest pain- L pleuritic R07.9    Tachycardia R00.0    Elevated lactic acid level E87.2    Hypoxemia R09.02       [facility] Problems  Date Reviewed: 3/6/2017          Codes Class Noted POA    CAP (community acquired pneumonia) ICD-10-CM: J18.9  ICD-9-CM: 693  3/6/2017 Unknown    On abx    Leukocytosis ICD-10-CM: D72.829  ICD-9-CM: 288.60  3/6/2017 Unknown        Sepsis ([address] ICD-10-CM: A41.9  ICD-9-CM: 038.9, 995.91  3/6/2017 Unknown    improved    Chest pain- L pleuritic ICD-10-CM: R07.9  ICD-9-CM: 786.50  3/6/2017 Unknown    With PNA    Tachycardia ICD-10-CM: R00.0  ICD-9-CM: 785.0  3/6/2017 Unknown    resovled    Elevated lactic acid level ICD-10-CM: E87.2  ICD-9-CM: 276.2  3/6/2017 Unknown    With hypotension    Hypoxemia ICD-10-CM: R09.02  ICD-9-CM: 799.02  3/6/2017 Unknown    Acute           -- advance diet  -- continue abx, pain is better  -- follow labs and CXR, may need Chest CT if fails to improve  -- transfer to the floor later today if blood pressure remains stable. Jun Brito NP    More than 50% of time documented was spent in face-to-face contact with the patient and in the care of the patient on the floor/unit where the patient is located. Lungs: decreased sounds in left lung base. Heart S1 and S2 audible, no murmers or rubs appreciated  Other     BP improved and now pouring urine  Will decreased IVF   Send to floor if stable in afternoon. I have spoken with and examined the patient. I have reviewed the history, examination, assessment, and plan and agree with the above. Qasim Singleton MD      This note was signed electronically. Errors are unfortunately her likely due to dictation software.

## 2017-03-06 NOTE — PROGRESS NOTES
Patient doing well with lower IVF. Will send to the floor. STop fluids tomorrow.  F/u with  to make sure she can get her meds at home, since that is what brought her back to the hospital.  Chad Monahan MD

## 2017-03-06 NOTE — PROGRESS NOTES
Consult for help with medication at d/c. In to see pt, she speaks [place] fairly well and answered questions appropriately in [place]. States she goes to [facility]. Unsure why she did not get Rx after d/c previous visit, other than she stated she did not have any money. States she lives with , no children. States he is starting work tomorrow. She states she filled out [facility] paperwork in past, but told her she did not have children and was not pregnant, so she is not eligible. Per [facility] email pt had started [facility]OLI paperwork, so not sure if started or not, but if goes to [facility], they could be assisting with, as well. We discussed mission voucher meds at d/c and also gave printed list of free antibiotics for [employer]. Did email Nichelle Salter with [employer] to see if pt goes to [facility] and to verify. Dr. Oskar Tucker is listed as PCP and pt did state that is who she sees. States she has been in [facility] since [facility], unsure if Kali Ty. ANDRÉS/ECTOR will continue to follow for d/c needs.

## 2017-03-06 NOTE — PROGRESS NOTES
Bedside, Verbal and Written shift change report given to ARCHANA Townsend (oncoming nurse) by Joanie Jenkins (offgoing nurse). Report included the following information SBAR, Kardex, ED Summary, Procedure Summary, Intake/Output, MAR, Recent Results and Cardiac Rhythm S. Tach. As noted, patient still with hypotension following 1 liter bolus in CCU. 2nd ICU bolus initiated at 500ml/hr with low dose levophed for pressure support. Oncoming RN aware of burgeoning hypotension issues and to initiate levo prior to completion of bolus. Patient remains alert/oriented. Care assumed by ARCHANA Townsend at this time.

## 2017-03-06 NOTE — IP AVS SNAPSHOT
Current Discharge Medication List  
  
Take these medications at their scheduled times Dose & Instructions Dispensing Information Comments Morning Noon Evening Bedtime  
 ciprofloxacin HCl 500 mg tablet Commonly known as:  CIPRO Your next dose is: Today, Tomorrow Other:  ____________ Dose:  500 mg Take 1 Tab by mouth every twelve (12) hours for 6 days. Quantity:  12 Tab Refills:  0  
     
   
   
   
  
 guaiFENesin 1,200 mg Ta12 ER tablet Commonly known as:  Ricky & Ricky Your next dose is: Today, Tomorrow Other:  ____________ Dose:  1200 mg Take 1 Tab by mouth two (2) times a day. Quantity:  28 Tab Refills:  0 Take these medications as needed Dose & Instructions Dispensing Information Comments Morning Noon Evening Bedtime HYDROcodone-acetaminophen 5-325 mg per tablet Commonly known as:  Jed Ybarra Your next dose is: Today, Tomorrow Other:  ____________ Dose:  1 Tab Take 1 Tab by mouth every six (6) hours as needed for Pain. Max Daily Amount: 4 Tabs. Quantity:  10 Tab Refills:  0 ASK your doctor about these medications Dose & Instructions Dispensing Information Comments Morning Noon Evening Bedtime  
 predniSONE 10 mg tablet Commonly known as:  Marcelene Im Your next dose is: Today, Tomorrow Other:  ____________ Dose:  10 mg Take 1 Tab by mouth daily (with breakfast). 5x3d, 4x3d, 3x3d, 2x3d, 1x3d Quantity:  45 Tab Refills:  0 Where to Get Your Medications Information about where to get these medications is not yet available ! Ask your nurse or doctor about these medications  
  ciprofloxacin HCl 500 mg tablet HYDROcodone-acetaminophen 5-325 mg per tablet

## 2017-03-06 NOTE — ED PROVIDER NOTES
HPI Comments: Patient diagnosed with pneumonia 2 days ago but was unable to get her prescription filled for her inhaler or Z-Kt. Patient now presents with continued left flank pain but with increased shortness of breath. Patient is tachycardic and hypoxic prior to arrival.  Blood pressure is normal.    Patient is a 51 y.o. female presenting with flank pain and shortness of breath. The history is provided by the patient and the EMS personnel. The history is limited by a language barrier. A  was used. Flank Pain    This is a new problem. The current episode started more than 2 days ago. The problem has been gradually worsening. The problem occurs constantly. The pain is associated with no known injury. The pain is present in the left side. The quality of the pain is described as sharp. Associated symptoms include a fever. Pertinent negatives include no chest pain, no numbness, no headaches, no abdominal pain, no dysuria, no paresthesias, no paresis, no tingling and no weakness. Shortness of Breath   This is a new problem. The average episode lasts 1 day. The problem occurs continuously. The problem has not changed since onset. Associated symptoms include a fever. Pertinent negatives include no headaches, no rhinorrhea, no sore throat, no cough, no chest pain, no vomiting, no abdominal pain and no leg swelling. Associated medical issues include pneumonia. Past Medical History:   Diagnosis Date    HTN (hypertension) 4/12/2013    Hypertension     no meds    Missed ab     6wks    Other ill-defined conditions(799.89)     kidney stones       History reviewed. No pertinent surgical history. History reviewed. No pertinent family history. Social History     Social History    Marital status: SINGLE     Spouse name: N/A    Number of children: N/A    Years of education: N/A     Occupational History    Not on file.      Social History Main Topics    Smoking status: Former Smoker    Smokeless tobacco: Never Used    Alcohol use No    Drug use: No    Sexual activity: No     Other Topics Concern    Not on file     Social History Narrative         ALLERGIES: Amoxicillin and Morphine    Review of Systems   Constitutional: Positive for fever. Negative for chills. HENT: Negative for congestion, rhinorrhea and sore throat. Eyes: Negative for photophobia and redness. Respiratory: Positive for shortness of breath. Negative for cough. Cardiovascular: Negative for chest pain and leg swelling. Gastrointestinal: Negative for abdominal pain, diarrhea, nausea and vomiting. Endocrine: Negative for polydipsia and polyuria. Genitourinary: Positive for flank pain. Negative for dysuria. Musculoskeletal: Negative for back pain and myalgias. Neurological: Negative for tingling, weakness, numbness, headaches and paresthesias. Vitals:    03/06/17 0029 03/06/17 0031 03/06/17 0035   BP:   139/73   Pulse:  (!) 141 (!) 138   Resp:  24 17   Temp: 99.7 °F (37.6 °C)  99.7 °F (37.6 °C)   SpO2:   95%   Weight: 68 kg (150 lb)  68 kg (150 lb)   Height: 5' 2\" (1.575 m)  5' 2\" (1.575 m)            Physical Exam   Constitutional: She is oriented to person, place, and time. She appears well-developed and well-nourished. HENT:   Mouth/Throat: Oropharynx is clear and moist. No oropharyngeal exudate. Eyes: Conjunctivae are normal. Pupils are equal, round, and reactive to light. Neck: Normal range of motion. Neck supple. Cardiovascular: Regular rhythm, normal heart sounds and intact distal pulses. Tachycardia present. No murmur heard. Pulmonary/Chest: No respiratory distress. She has rales (Rales left lower lobe). Abdominal: Soft. She exhibits no distension. There is no tenderness. There is no rebound and no guarding. Musculoskeletal: Normal range of motion. She exhibits no edema or tenderness. Neurological: She is alert and oriented to person, place, and time. She has normal strength.  No sensory deficit. Skin: Skin is warm and dry. Nursing note and vitals reviewed. MDM  Number of Diagnoses or Management Options  Diagnosis management comments: probable sepsis from pneumonia. Recheck chest x-ray. Blood cultures and lactic acid and pro-calcitonin ordered. Fluid bolus and challenge given. Antibiotics ordered.        Amount and/or Complexity of Data Reviewed  Clinical lab tests: ordered and reviewed  Tests in the radiology section of CPT®: ordered and reviewed      ED Course       Procedures

## 2017-03-06 NOTE — PROGRESS NOTES
Spiritual Care Assessment/Progress Notes    Miles Kruse [id number]  [id number]    1970  51 y.o.  female    Patient Telephone Number: [phone] (home)   Christian Affiliation: Sabianist   Language: Mosotho   Extended Emergency Contact Information  Primary Emergency Contact: Shae Luna  Address: Patient does not think that the cell number for daughter is correct   [address] Phone: [phone]  Relation: Child   Patient Active Problem List    Diagnosis Date Noted    CAP (community acquired pneumonia) 03/06/2017    Leukocytosis 03/06/2017    Sepsis ([address] 03/06/2017    Chest pain- L pleuritic 03/06/2017    Tachycardia 03/06/2017    Elevated lactic acid level 03/06/2017    Hypoxemia 03/06/2017    HTN (hypertension) 04/12/2013        Date: 3/6/2017       Level of Christian/Spiritual Activity:  []         Involved in moustapha tradition/spiritual practice    []         Not involved in moustapha tradition/spiritual practice  [x]         Spiritually oriented    []         Claims no spiritual orientation    []         seeking spiritual identity  []         Feels alienated from Religion practice/tradition  []         Feels angry about Religion practice/tradition  []         Spirituality/Religion tradition is a resource for coping at this time.   []         Not able to assess due to medical condition    Services Provided Today:  []         crisis intervention    []         reading Scriptures  [x]         spiritual assessment    []         prayer  []         empathic listening/emotional support  []         rites and rituals (cite in comments)  []         life review     []         Religion support  []         theological development   []         advocacy  []         ethical dialog     []         blessing  []         bereavement support    []         support to family  []         anticipatory grief support   []         help with AMD  []         spiritual guidance    [] meditation      Spiritual Care Needs  []         Emotional Support  []         Spiritual/Latter-day Care  []         Loss/Adjustment  []         Advocacy/Referral                /Ethics  [x]         No needs expressed at               this time  []         Other: (note in               comments)  [address]  []         Follow up visits with               pt/family  []         Provide materials  []         Schedule sacraments  []         Contact Community               Clergy  [x]         Follow up as needed  []         Other: (note in               comments)     Comments: patient awake and aware of my presence in the room  Patient is Panamanian speaking but was able to communicate and understand our conversation in [place]  Patient stated that she lives with her boyfriend  Patient's preferred Buddhism tradition is Yazidism - patient did not request a  at this time    Laurie Chavez.  Shelby Hines

## 2017-03-07 ENCOUNTER — APPOINTMENT (OUTPATIENT)
Dept: GENERAL RADIOLOGY | Age: 47
DRG: 871 | End: 2017-03-07
Attending: INTERNAL MEDICINE
Payer: SELF-PAY

## 2017-03-07 LAB
ALBUMIN SERPL BCP-MCNC: 2.2 G/DL (ref 3.5–5)
ALBUMIN/GLOB SERPL: 0.6 {RATIO} (ref 1.2–3.5)
ALP SERPL-CCNC: 68 U/L (ref 50–136)
ALT SERPL-CCNC: 13 U/L (ref 12–65)
ANION GAP BLD CALC-SCNC: 7 MMOL/L (ref 7–16)
AST SERPL W P-5'-P-CCNC: 9 U/L (ref 15–37)
BASOPHILS # BLD AUTO: 0 K/UL (ref 0–0.2)
BASOPHILS # BLD: 0 % (ref 0–2)
BILIRUB SERPL-MCNC: 0.2 MG/DL (ref 0.2–1.1)
BUN SERPL-MCNC: 12 MG/DL (ref 6–23)
CALCIUM SERPL-MCNC: 7.4 MG/DL (ref 8.3–10.4)
CHLORIDE SERPL-SCNC: 111 MMOL/L (ref 98–107)
CO2 SERPL-SCNC: 25 MMOL/L (ref 21–32)
CREAT SERPL-MCNC: 0.63 MG/DL (ref 0.6–1)
DIFFERENTIAL METHOD BLD: ABNORMAL
EOSINOPHIL # BLD: 0 K/UL (ref 0–0.8)
EOSINOPHIL NFR BLD: 0 % (ref 0.5–7.8)
ERYTHROCYTE [DISTWIDTH] IN BLOOD BY AUTOMATED COUNT: 17.1 % (ref 11.9–14.6)
GLOBULIN SER CALC-MCNC: 4 G/DL (ref 2.3–3.5)
GLUCOSE BLD STRIP.AUTO-MCNC: 111 MG/DL (ref 65–100)
GLUCOSE BLD STRIP.AUTO-MCNC: 133 MG/DL (ref 65–100)
GLUCOSE BLD STRIP.AUTO-MCNC: 88 MG/DL (ref 65–100)
GLUCOSE BLD STRIP.AUTO-MCNC: 95 MG/DL (ref 65–100)
GLUCOSE SERPL-MCNC: 167 MG/DL (ref 65–100)
HCT VFR BLD AUTO: 25.3 % (ref 35.8–46.3)
HGB BLD-MCNC: 7.5 G/DL (ref 11.7–15.4)
IMM GRANULOCYTES # BLD: 0.2 K/UL (ref 0–0.5)
IMM GRANULOCYTES NFR BLD AUTO: 0.6 % (ref 0–5)
LYMPHOCYTES # BLD AUTO: 11 % (ref 13–44)
LYMPHOCYTES # BLD: 2.5 K/UL (ref 0.5–4.6)
MCH RBC QN AUTO: 23.2 PG (ref 26.1–32.9)
MCHC RBC AUTO-ENTMCNC: 29.6 G/DL (ref 31.4–35)
MCV RBC AUTO: 78.3 FL (ref 79.6–97.8)
MONOCYTES # BLD: 1.7 K/UL (ref 0.1–1.3)
MONOCYTES NFR BLD AUTO: 7 % (ref 4–12)
NEUTS SEG # BLD: 18.9 K/UL (ref 1.7–8.2)
NEUTS SEG NFR BLD AUTO: 81 % (ref 43–78)
PLATELET # BLD AUTO: 257 K/UL (ref 150–450)
PMV BLD AUTO: 11.9 FL (ref 10.8–14.1)
POTASSIUM SERPL-SCNC: 3.9 MMOL/L (ref 3.5–5.1)
PROCALCITONIN SERPL-MCNC: 0.9 NG/ML
PROT SERPL-MCNC: 6.2 G/DL (ref 6.3–8.2)
RBC # BLD AUTO: 3.23 M/UL (ref 4.05–5.25)
SODIUM SERPL-SCNC: 143 MMOL/L (ref 136–145)
WBC # BLD AUTO: 23.2 K/UL (ref 4.3–11.1)

## 2017-03-07 PROCEDURE — 74011250636 HC RX REV CODE- 250/636: Performed by: EMERGENCY MEDICINE

## 2017-03-07 PROCEDURE — 74011250636 HC RX REV CODE- 250/636: Performed by: INTERNAL MEDICINE

## 2017-03-07 PROCEDURE — 80053 COMPREHEN METABOLIC PANEL: CPT | Performed by: INTERNAL MEDICINE

## 2017-03-07 PROCEDURE — 99232 SBSQ HOSP IP/OBS MODERATE 35: CPT | Performed by: INTERNAL MEDICINE

## 2017-03-07 PROCEDURE — 65270000029 HC RM PRIVATE

## 2017-03-07 PROCEDURE — 71010 XR CHEST SNGL V: CPT

## 2017-03-07 PROCEDURE — 74011250637 HC RX REV CODE- 250/637: Performed by: INTERNAL MEDICINE

## 2017-03-07 PROCEDURE — 82962 GLUCOSE BLOOD TEST: CPT

## 2017-03-07 PROCEDURE — 74011000258 HC RX REV CODE- 258: Performed by: EMERGENCY MEDICINE

## 2017-03-07 PROCEDURE — 84145 PROCALCITONIN (PCT): CPT | Performed by: INTERNAL MEDICINE

## 2017-03-07 PROCEDURE — 74011000250 HC RX REV CODE- 250: Performed by: INTERNAL MEDICINE

## 2017-03-07 PROCEDURE — 36415 COLL VENOUS BLD VENIPUNCTURE: CPT | Performed by: INTERNAL MEDICINE

## 2017-03-07 PROCEDURE — 85025 COMPLETE CBC W/AUTO DIFF WBC: CPT | Performed by: INTERNAL MEDICINE

## 2017-03-07 RX ORDER — AMOXICILLIN 250 MG
1 CAPSULE ORAL DAILY
Status: DISCONTINUED | OUTPATIENT
Start: 2017-03-07 | End: 2017-03-08 | Stop reason: HOSPADM

## 2017-03-07 RX ORDER — AMOXICILLIN 250 MG
1 CAPSULE ORAL DAILY
Status: DISCONTINUED | OUTPATIENT
Start: 2017-03-08 | End: 2017-03-07

## 2017-03-07 RX ADMIN — HYDROMORPHONE HYDROCHLORIDE 1 MG: 1 INJECTION, SOLUTION INTRAMUSCULAR; INTRAVENOUS; SUBCUTANEOUS at 15:18

## 2017-03-07 RX ADMIN — HYDROCODONE BITARTRATE AND ACETAMINOPHEN 1 TABLET: 10; 325 TABLET ORAL at 13:16

## 2017-03-07 RX ADMIN — ACETAMINOPHEN 650 MG: 325 TABLET, FILM COATED ORAL at 21:07

## 2017-03-07 RX ADMIN — AZITHROMYCIN MONOHYDRATE 500 MG: 500 INJECTION, POWDER, LYOPHILIZED, FOR SOLUTION INTRAVENOUS at 01:45

## 2017-03-07 RX ADMIN — FAMOTIDINE 20 MG: 10 INJECTION, SOLUTION INTRAVENOUS at 09:00

## 2017-03-07 RX ADMIN — Medication 10 ML: at 05:20

## 2017-03-07 RX ADMIN — GUAIFENESIN 1200 MG: 600 TABLET, EXTENDED RELEASE ORAL at 08:59

## 2017-03-07 RX ADMIN — CEFTRIAXONE 2 G: 2 INJECTION, POWDER, FOR SOLUTION INTRAMUSCULAR; INTRAVENOUS at 01:45

## 2017-03-07 RX ADMIN — Medication 10 ML: at 21:09

## 2017-03-07 RX ADMIN — DOCUSATE SODIUM 100 MG: 100 CAPSULE, LIQUID FILLED ORAL at 08:59

## 2017-03-07 RX ADMIN — FAMOTIDINE 20 MG: 10 INJECTION, SOLUTION INTRAVENOUS at 21:08

## 2017-03-07 RX ADMIN — GUAIFENESIN 1200 MG: 600 TABLET, EXTENDED RELEASE ORAL at 21:08

## 2017-03-07 RX ADMIN — HYDROCODONE BITARTRATE AND ACETAMINOPHEN 1 TABLET: 10; 325 TABLET ORAL at 05:16

## 2017-03-07 RX ADMIN — DOCUSATE SODIUM 100 MG: 100 CAPSULE, LIQUID FILLED ORAL at 17:51

## 2017-03-07 RX ADMIN — HYDROCODONE BITARTRATE AND ACETAMINOPHEN 1 TABLET: 10; 325 TABLET ORAL at 23:29

## 2017-03-07 RX ADMIN — PRAVASTATIN SODIUM 1 TABLET: 20 TABLET ORAL at 13:16

## 2017-03-07 RX ADMIN — Medication 10 ML: at 13:17

## 2017-03-07 NOTE — PROGRESS NOTES
PT Note:  Per RN pt is moving around room independently and pt does not require PT eval at this time. Will discontinue order at this time. Please re-consult if status changes.   Thanks,  Olivia Schneider, PT, DPT

## 2017-03-07 NOTE — PROGRESS NOTES
[facility] CRITICAL CARE OUTREACH NURSE PROGRESS REPORT      SUBJECTIVE: Called to assess patient secondary to outreach protocol. MEWS Score: 1 (03/07/17 1450)  Vitals:    03/07/17 1252 03/07/17 1351 03/07/17 1450 03/07/17 1451   BP: 136/80 137/80 (!) 145/92 (!) 145/92   Pulse: (!) 57 94 67    Resp: 16 23 17    Temp:   98.3 °F (36.8 °C)    SpO2:       Weight:       Height:          EKG: not currently on monitor. LAB DATA:    Recent Labs      03/07/17   0435  03/06/17   0042   NA  143  140   K  3.9  3.8   CL  111*  105   CO2  25  23   AGAP  7  12   GLU  167*  94   BUN  12  9   CREA  0.63  0.94   GFRAA  >60  >60   GFRNA  >60  >60   CA  7.4*  8.6   ALB  2.2*  3.2*   TP  6.2*  8.0   GLOB  4.0*  4.8*   AGRAT  0.6*  0.7*   ALT  13  13        Recent Labs      03/07/17   0435  03/06/17   0600  03/06/17   0042   WBC  23.2*  17.3*  17.1*   HGB  7.5*  7.6*  10.6*   HCT  25.3*  25.6*  33.8*   PLT  257  217  286          OBJECTIVE: On arrival to room, I found patient to be resting in bed. Pain Assessment  Pain Intensity 1: 0 (03/07/17 1559)  Pain Location 1: Back, Chest, Head  Pain Intervention(s) 1: Medication (see MAR)  Patient Stated Pain Goal: 0                                 ASSESSMENT:  Pt alert and oriented, no pain or distress noted. Lungs CTA. 02 sat 100% on RA. No immediate needs at this time. PLAN:      Will continue to follow up per outreach protocol.     Signed By:   Rodolfo Murphy RN    March 7, 2017 4:47 PM

## 2017-03-07 NOTE — INTERDISCIPLINARY ROUNDS
Interdisciplinary team rounds were held 3/7/2017 with the following team members:Care Management, Nursing, Nurse Practitioner, Nutrition, Palliative Care, Pastoral Care, Pharmacy, Physical Therapy, Physician, Physician's Assistant, Respiratory Therapy, Wound Care and Clinical Coordinator and the patient. Plan of care discussed. See clinical pathway and/or care plan for interventions and desired outcomes.

## 2017-03-07 NOTE — PROGRESS NOTES
Nithya Trevizo  Admission Date: 3/6/2017             Daily Progress Note: 3/7/2017   The patient is a 51 y.o.  female who was here 2 days ago and found to have a LLL pneumonia. She was prescribed a Zpak but did not have the money to afford it. She has worsened and now presents with increased dyspnea, hemoptysis, and severe chest pain. She is also very tachycardic but has a stable BP. Her CXR shows some progression. She is now admitted to the ICU for pneumonia with sepsis and could have empyema given the severity of pain. Her LA is 2.0 and WBC 17.1. She is not having diffuse pain and her influenza swab was negative 2 days ago. Repeat flu negative. .    Subjective:     patient today awake and alert feels better. Not coughing as much and less dyspnea. No fevers today.      Review of Systems:  -Fever  -Headaches  -Chest pain  +Dyspnea,- wheezing,+ cough (less)  -Abdominal pain,- constipation  -Leg swelling  All other organ systems grossly normal.      Current Facility-Administered Medications   Medication Dose Route Frequency    sodium chloride (NS) flush 5-10 mL  5-10 mL IntraVENous PRN    cefTRIAXone (ROCEPHIN) 2 g in 0.9% sodium chloride (MBP/ADV) 50 mL  2 g IntraVENous Q24H    azithromycin (ZITHROMAX) 500 mg in 0.9% sodium chloride (MBP/ADV) 250 mL  500 mg IntraVENous Q24H    guaiFENesin ER (MUCINEX) tablet 1,200 mg  1,200 mg Oral BID    sodium chloride (NS) flush 5-10 mL  5-10 mL IntraVENous Q8H    naloxone (NARCAN) injection 0.4 mg  0.4 mg IntraVENous PRN    0.9% sodium chloride infusion  50 mL/hr IntraVENous CONTINUOUS    acetaminophen (TYLENOL) tablet 650 mg  650 mg Oral Q4H PRN    HYDROcodone-acetaminophen (NORCO)  mg tablet 1 Tab  1 Tab Oral Q4H PRN    HYDROmorphone (PF) (DILAUDID) injection 1 mg  1 mg IntraVENous Q4H PRN    docusate sodium (COLACE) capsule 100 mg  100 mg Oral BID    famotidine (PF) (PEPCID) injection 20 mg  20 mg IntraVENous Q12H    insulin regular (NOVOLIN R, HUMULIN R) injection   SubCUTAneous AC&HS    enoxaparin (LOVENOX) injection 40 mg  40 mg SubCUTAneous Q24H    influenza vaccine 2016-17 (36mos+)(PF) (FLUZONE/FLUARIX/FLULAVAL QUAD) injection 0.5 mL  0.5 mL IntraMUSCular PRIOR TO DISCHARGE         Objective:     Vitals:    03/07/17 0655 03/07/17 0754 03/07/17 0854 03/07/17 0953   BP: 110/76 133/78 145/90 126/76   Pulse: 63 73 88 63   Resp: 15 20 19 16   Temp: 98.2 °F (36.8 °C)      SpO2: 92% 95% 96% 96%   Weight:       Height:         Intake and Output:   03/05 1901 - 03/07 0700  In: 2560 [P.O.:240; I.V.:2320]  Out: 4585 [Urine:3195]  03/07 0701 - 03/07 1900  In: 240 [P.O.:240]  Out: 160 [Urine:160]    Physical Exam:          GEN: well developed and in no acute distress, on RA  HEENT: no alar flaring or epistaxis, oral mucosa moist without cyanosis,   NECK:  no JVD, no retractions, no thyromegaly or masses,   LUNGS:  Decreased left base posteriorly, + productive sputum   HEART:  RRR with no M,G,R;  ABDOMEN:  soft with no tenderness; positive bowel sounds present  EXTREMITIES:  warm with no cyanosis,  no lower leg edema  SKIN:  no jaundice or ecchymosis   NEURO:  alert and oriented X 3  Lines/Drains: IV  Nutrition: regular    CHEST XRAY:  Noted LLL infiltrate today.      LAB  Recent Labs      03/07/17   0435  03/06/17   0600 03/06/17   0042   WBC  23.2*  17.3*  17.1*   HGB  7.5*  7.6*  10.6*   HCT  25.3*  25.6*  33.8*   PLT  257  217  286     Recent Labs      03/07/17   0435  03/06/17   0600  03/06/17   0042   NA  143   --   140   K  3.9   --   3.8   CL  111*   --   105   CO2  25   --   23   GLU  167*   --   94   BUN  12   --   9   CREA  0.63   --   0.94   INR   --   1.1   --      Recent Labs      03/06/17   0242   PH  7.40   PCO2  34*   PO2  61*   HCO3  20*       Assessment:         Hospital Problems  Date Reviewed: 3/6/2017          Codes Class Noted POA    CAP (community acquired pneumonia) ICD-10-CM: J18.9  ICD-9-CM: 843  3/6/2017 Unknown    On abx Leukocytosis ICD-10-CM: I78.654  ICD-9-CM: 288.60  3/6/2017 Unknown        Sepsis ([address] ICD-10-CM: A41.9  ICD-9-CM: 038.9, 995.91  3/6/2017 Unknown    improved    Chest pain- L pleuritic ICD-10-CM: R07.9  ICD-9-CM: 786.50  3/6/2017 Unknown    With PNA    Tachycardia ICD-10-CM: R00.0  ICD-9-CM: 785.0  3/6/2017 Unknown    resolved    Elevated lactic acid level ICD-10-CM: E87.2  ICD-9-CM: 276.2  3/6/2017 Unknown    rsolved    Hypoxemia ICD-10-CM: R09.02  ICD-9-CM: 799.02  3/6/2017 Unknown    resolved         Anemia  --hg lower but stable. No history of periods now and no melena. Plan     --will keep here since WBC is going up to 23 K now. F/u CBC tomorrow. If keeps going up may need to change abx.  --continue currently abx, no cultures are positive and no fevers right now. Spoke with  and they have vouchers for her to help with meds  --continue remaining treatment. --transfer to the floor since yesterday -- awaiting bed    Gonzalez Curry MD    More than 50% of time documented was spent in face-to-face contact with the patient and in the care of the patient on the floor/unit where the patient is located.

## 2017-03-07 NOTE — PROGRESS NOTES
Services    Services at [facility] is dedicated to helping providers establish a direct relationship with their non-English, limited-English proficient, or deaf patients. Wilber Castro [phone] available    Tuesday, March 7th (8:00am - 4:30pm)       For on call Monday to Friday (4:30pm - 8:00am)   On call Weekends (24 hours)    please refer to the monthly calendar online in [address], for the corresponding . Thank you for this referral,      Wilber Castro, [address] [facility]  /Patient [address]  [address] [facility] [address] [facility], [facility] [address] [facility]    [phone]

## 2017-03-07 NOTE — PROGRESS NOTES
Patient c/o sharp pain on chest when she coughs strongly. Pain score is 9/10. Administered Norco  mg tablet. VSS, no distress noted otherwise. Will continue to monitor patient.

## 2017-03-08 VITALS
SYSTOLIC BLOOD PRESSURE: 148 MMHG | WEIGHT: 168 LBS | TEMPERATURE: 99.5 F | OXYGEN SATURATION: 97 % | HEART RATE: 82 BPM | RESPIRATION RATE: 10 BRPM | DIASTOLIC BLOOD PRESSURE: 92 MMHG | BODY MASS INDEX: 30.91 KG/M2 | HEIGHT: 62 IN

## 2017-03-08 PROBLEM — R00.0 TACHYCARDIA: Status: RESOLVED | Noted: 2017-03-06 | Resolved: 2017-03-08

## 2017-03-08 LAB
BASOPHILS # BLD AUTO: 0.1 K/UL (ref 0–0.2)
BASOPHILS # BLD: 0 % (ref 0–2)
DIFFERENTIAL METHOD BLD: ABNORMAL
EOSINOPHIL # BLD: 0.2 K/UL (ref 0–0.8)
EOSINOPHIL NFR BLD: 1 % (ref 0.5–7.8)
ERYTHROCYTE [DISTWIDTH] IN BLOOD BY AUTOMATED COUNT: 16.9 % (ref 11.9–14.6)
GLUCOSE BLD STRIP.AUTO-MCNC: 77 MG/DL (ref 65–100)
HCT VFR BLD AUTO: 26.4 % (ref 35.8–46.3)
HGB BLD-MCNC: 8 G/DL (ref 11.7–15.4)
IMM GRANULOCYTES # BLD: 0.1 K/UL (ref 0–0.5)
IMM GRANULOCYTES NFR BLD AUTO: 0.5 % (ref 0–5)
L PNEUMO1 AG UR QL IA: NEGATIVE
LYMPHOCYTES # BLD AUTO: 25 % (ref 13–44)
LYMPHOCYTES # BLD: 3.7 K/UL (ref 0.5–4.6)
MCH RBC QN AUTO: 23.3 PG (ref 26.1–32.9)
MCHC RBC AUTO-ENTMCNC: 30.3 G/DL (ref 31.4–35)
MCV RBC AUTO: 77 FL (ref 79.6–97.8)
MONOCYTES # BLD: 1.2 K/UL (ref 0.1–1.3)
MONOCYTES NFR BLD AUTO: 8 % (ref 4–12)
NEUTS SEG # BLD: 9.8 K/UL (ref 1.7–8.2)
NEUTS SEG NFR BLD AUTO: 66 % (ref 43–78)
PLATELET # BLD AUTO: 294 K/UL (ref 150–450)
PMV BLD AUTO: 11.7 FL (ref 10.8–14.1)
RBC # BLD AUTO: 3.43 M/UL (ref 4.05–5.25)
SPECIMEN SOURCE: NORMAL
WBC # BLD AUTO: 14.9 K/UL (ref 4.3–11.1)

## 2017-03-08 PROCEDURE — 74011250637 HC RX REV CODE- 250/637: Performed by: INTERNAL MEDICINE

## 2017-03-08 PROCEDURE — 77030020120 HC VLV RESP PEP HI -B

## 2017-03-08 PROCEDURE — 82962 GLUCOSE BLOOD TEST: CPT

## 2017-03-08 PROCEDURE — 74011000258 HC RX REV CODE- 258: Performed by: EMERGENCY MEDICINE

## 2017-03-08 PROCEDURE — 85025 COMPLETE CBC W/AUTO DIFF WBC: CPT | Performed by: INTERNAL MEDICINE

## 2017-03-08 PROCEDURE — 74011250636 HC RX REV CODE- 250/636: Performed by: EMERGENCY MEDICINE

## 2017-03-08 PROCEDURE — 74011000250 HC RX REV CODE- 250: Performed by: INTERNAL MEDICINE

## 2017-03-08 PROCEDURE — 36415 COLL VENOUS BLD VENIPUNCTURE: CPT | Performed by: INTERNAL MEDICINE

## 2017-03-08 PROCEDURE — 99239 HOSP IP/OBS DSCHRG MGMT >30: CPT | Performed by: INTERNAL MEDICINE

## 2017-03-08 RX ORDER — CIPROFLOXACIN 500 MG/1
500 TABLET ORAL EVERY 12 HOURS
Qty: 12 TAB | Refills: 0 | Status: SHIPPED | OUTPATIENT
Start: 2017-03-08 | End: 2017-03-14

## 2017-03-08 RX ORDER — HYDROCODONE BITARTRATE AND ACETAMINOPHEN 5; 325 MG/1; MG/1
1 TABLET ORAL
Qty: 10 TAB | Refills: 0 | Status: SHIPPED | OUTPATIENT
Start: 2017-03-08 | End: 2017-09-04

## 2017-03-08 RX ADMIN — Medication 10 ML: at 05:41

## 2017-03-08 RX ADMIN — FAMOTIDINE 20 MG: 10 INJECTION, SOLUTION INTRAVENOUS at 09:00

## 2017-03-08 RX ADMIN — DOCUSATE SODIUM 100 MG: 100 CAPSULE, LIQUID FILLED ORAL at 09:00

## 2017-03-08 RX ADMIN — CEFTRIAXONE 2 G: 2 INJECTION, POWDER, FOR SOLUTION INTRAMUSCULAR; INTRAVENOUS at 00:04

## 2017-03-08 RX ADMIN — GUAIFENESIN 1200 MG: 600 TABLET, EXTENDED RELEASE ORAL at 09:00

## 2017-03-08 RX ADMIN — AZITHROMYCIN MONOHYDRATE 500 MG: 500 INJECTION, POWDER, LYOPHILIZED, FOR SOLUTION INTRAVENOUS at 00:05

## 2017-03-08 RX ADMIN — PRAVASTATIN SODIUM 1 TABLET: 20 TABLET ORAL at 09:00

## 2017-03-08 NOTE — PROGRESS NOTES
TRANSFER - IN REPORT:    [facility]  being received from ICU for routine progression of care      Report consisted of patients Situation, Background, Assessment and   Recommendations(SBAR). Information from the following report(s) SBAR was reviewed with the receiving nurse. Opportunity for questions and clarification was provided. Assessment completed upon patients arrival to unit and care assumed.

## 2017-03-08 NOTE — DISCHARGE INSTRUCTIONS
DISCHARGE SUMMARY from Nurse    The following personal items are in your possession at time of discharge:    Dental Appliances: None  Visual Aid: Glasses     Home Medications: None  Jewelry: Ring (rings x 2)  Clothing: Pants, Shirt  Other Valuables: Cell Phone             PATIENT INSTRUCTIONS:    After general anesthesia or intravenous sedation, for 24 hours or while taking prescription Narcotics:  · Limit your activities  · Do not drive and operate hazardous machinery  · Do not make important personal or business decisions  · Do  not drink alcoholic beverages  · If you have not urinated within 8 hours after discharge, please contact your surgeon on call. Report the following to your surgeon:  · Excessive pain, swelling, redness or odor of or around the surgical area  · Temperature over 100.5  · Nausea and vomiting lasting longer than 4 hours or if unable to take medications  · Any signs of decreased circulation or nerve impairment to extremity: change in color, persistent  numbness, tingling, coldness or increase pain  · Any questions        What to do at Home:  Recommended activity: Activity as tolerated,     If you experience any of the following symptoms fever, cough not controlled with medicines or rest, please follow up with primary doctor, go to ER, call 911. *  Please give a list of your current medications to your Primary Care Provider. *  Please update this list whenever your medications are discontinued, doses are      changed, or new medications (including over-the-counter products) are added. *  Please carry medication information at all times in case of emergency situations. These are general instructions for a healthy lifestyle:    No smoking/ No tobacco products/ Avoid exposure to second hand smoke    Surgeon General's Warning:  Quitting smoking now greatly reduces serious risk to your health.     Obesity, smoking, and sedentary lifestyle greatly increases your risk for illness    A healthy diet, regular physical exercise & weight monitoring are important for maintaining a healthy lifestyle    You may be retaining fluid if you have a history of heart failure or if you experience any of the following symptoms:  Weight gain of 3 pounds or more overnight or 5 pounds in a week, increased swelling in our hands or feet or shortness of breath while lying flat in bed. Please call your doctor as soon as you notice any of these symptoms; do not wait until your next office visit. Recognize signs and symptoms of STROKE:    F-face looks uneven    A-arms unable to move or move unevenly    S-speech slurred or non-existent    T-time-call 911 as soon as signs and symptoms begin-DO NOT go       Back to bed or wait to see if you get better-TIME IS BRAIN. Warning Signs of HEART ATTACK     Call 911 if you have these symptoms:   Chest discomfort. Most heart attacks involve discomfort in the center of the chest that lasts more than a few minutes, or that goes away and comes back. It can feel like uncomfortable pressure, squeezing, fullness, or pain.  Discomfort in other areas of the upper body. Symptoms can include pain or discomfort in one or both arms, the back, neck, jaw, or stomach.  Shortness of breath with or without chest discomfort.  Other signs may include breaking out in a cold sweat, nausea, or lightheadedness. Don't wait more than five minutes to call 911 - MINUTES MATTER! Fast action can save your life. Calling 911 is almost always the fastest way to get lifesaving treatment. Emergency Medical Services staff can begin treatment when they arrive -- up to an hour sooner than if someone gets to the hospital by car. The discharge information has been reviewed with the patient. The patient verbalized understanding. Discharge medications reviewed with the patient and appropriate educational materials and side effects teaching were provided.

## 2017-03-08 NOTE — PROGRESS NOTES
Paper work from ICU found in pts chart, given to pt, nursing will print discharge  Papers and give to pt at this time, will follow up with call for cab

## 2017-03-08 NOTE — PROGRESS NOTES
Call placed to [facility] per request of pt, due to \"very important papers\" given to her before her transfer to [facility] from [facility], pt states that these papers are \"very important\" to her, will wait for information from Marcus Lanes in [employer]

## 2017-03-08 NOTE — PROGRESS NOTES
Problem: Interdisciplinary Rounds  Goal: Interdisciplinary Rounds  Outcome: Resolved/Met Date Met:  03/08/17  Interdisciplinary team rounds were held 3/8/2017 with the following team members:Care Management, Nursing and Clinical Coordinator and the patient. Plan of care discussed. See clinical pathway and/or care plan for interventions and desired outcomes.

## 2017-03-08 NOTE — PROGRESS NOTES
Destini Nogeuira  Admission Date: 3/6/2017             Daily Progress Note: 3/8/2017    The patient's chart is reviewed and the patient is discussed with the staff. 51 y.o.  female who was here 2 days ago and found to have a LLL pneumonia. She was prescribed a Zpak but did not have the money to afford it. She has worsened, presents back with increased dyspnea, hemoptysis, and severe chest pain. She is also very tachycardic but has a stable BP. Her CXR shows some progression. She is now admitted to the ICU for pneumonia with sepsis and could have empyema given the severity of pain. Her LA is 2.0 and WBC 17.1. She is not having diffuse pain and her influenza swab was negative 2 days ago. Repeat flu negative. Was placed on antibiotics, steroids and nebs. Subjective:     Sitting in in chair, denies shortness of breath and states she is feeling better. Occasional sputum production.     Current Facility-Administered Medications   Medication Dose Route Frequency    senna-docusate (PERICOLACE) 8.6-50 mg per tablet 1 Tab  1 Tab Oral DAILY    sodium chloride (NS) flush 5-10 mL  5-10 mL IntraVENous PRN    cefTRIAXone (ROCEPHIN) 2 g in 0.9% sodium chloride (MBP/ADV) 50 mL  2 g IntraVENous Q24H    azithromycin (ZITHROMAX) 500 mg in 0.9% sodium chloride (MBP/ADV) 250 mL  500 mg IntraVENous Q24H    guaiFENesin ER (MUCINEX) tablet 1,200 mg  1,200 mg Oral BID    sodium chloride (NS) flush 5-10 mL  5-10 mL IntraVENous Q8H    naloxone (NARCAN) injection 0.4 mg  0.4 mg IntraVENous PRN    acetaminophen (TYLENOL) tablet 650 mg  650 mg Oral Q4H PRN    HYDROcodone-acetaminophen (NORCO)  mg tablet 1 Tab  1 Tab Oral Q4H PRN    HYDROmorphone (PF) (DILAUDID) injection 1 mg  1 mg IntraVENous Q4H PRN    docusate sodium (COLACE) capsule 100 mg  100 mg Oral BID    famotidine (PF) (PEPCID) injection 20 mg  20 mg IntraVENous Q12H    insulin regular (NOVOLIN R, HUMULIN R) injection SubCUTAneous AC&HS    enoxaparin (LOVENOX) injection 40 mg  40 mg SubCUTAneous Q24H    influenza vaccine 2016-17 (36mos+)(PF) (FLUZONE/FLUARIX/FLULAVAL QUAD) injection 0.5 mL  0.5 mL IntraMUSCular PRIOR TO DISCHARGE       Review of Systems  Constitutional: negative for fever, chills, sweats  Cardiovascular: negative for chest pain, palpitations, syncope, edema  Gastrointestinal:  negative for dysphagia, reflux, vomiting, diarrhea, abdominal pain, or melena  Neurologic:  negative for focal weakness, numbness, headache    Objective:     Vitals:    03/07/17 2321 03/08/17 0330 03/08/17 0530 03/08/17 0810   BP: 115/70 117/66  118/78   Pulse: 82 69  80   Resp: 18 18     Temp: 98.9 °F (37.2 °C) 97.9 °F (36.6 °C)  98.9 °F (37.2 °C)   SpO2: 94% 95%  97%   Weight:   76.2 kg (168 lb)    Height:         Intake and Output:   03/06 1901 - 03/08 0700  In: 588 [P.O.:360; I.V.:228]  Out: 1910 [Urine:1910]  03/08 0701 - 03/08 1900  In: 240 [P.O.:240]  Out: -     Physical Exam:   Constitution:  the patient is well developed and in no acute distress, room air  EENMT:  Sclera clear, pupils equal, oral mucosa moist  Respiratory: no wheezing, occasional productive cough with scant sputum  Cardiovascular:  RRR without M,G,R  Gastrointestinal: soft and non-tender; with positive bowel sounds. Musculoskeletal: warm without cyanosis. There is no lower leg edema.   Skin:  no jaundice or rashes, no wounds   Neurologic: no gross neuro deficits     Psychiatric:  alert and oriented x 3    CHEST XRAY: None today    CXR 3/7/17:        LAB  Recent Labs      03/08/17   0523  03/07/17   2130  03/07/17   1614  03/07/17   1120  03/07/17   0707   GLUCPOC  77  88  111*  95  133*      Recent Labs      03/08/17   0712  03/07/17   0435  03/06/17   0600  03/06/17   0042   WBC  14.9*  23.2*  17.3*  17.1*   HGB  8.0*  7.5*  7.6*  10.6*   HCT  26.4*  25.3*  25.6*  33.8*   PLT  294  257  217  286   INR   --    --   1.1   --      Recent Labs      03/07/17   0431 03/06/17   0042   NA  143  140   K  3.9  3.8   CL  111*  105   CO2  25  23   GLU  167*  94   BUN  12  9   CREA  0.63  0.94   CA  7.4*  8.6   ALB  2.2*  3.2*   TBILI  0.2  0.7   ALT  13  13   SGOT  9*  10*     Recent Labs      03/06/17   0242   PH  7.40   PCO2  34*   PO2  61*   HCO3  20*     No results for input(s): LCAD, LAC in the last 72 hours. Assessment:  (Medical Decision Making)     Hospital Problems  Date Reviewed: 3/8/2017          Codes Class Noted POA    * (Principal)CAP (community acquired pneumonia) ICD-10-CM: J18.9  ICD-9-CM: 123  3/6/2017 Yes    Left infiltrate--on antibiotics    Leukocytosis ICD-10-CM: D72.829  ICD-9-CM: 288.60  3/6/2017 Yes    trending down    Sepsis ([facility] [address] ICD-10-CM: A41.9  ICD-9-CM: 038.9, 995.91  3/6/2017 Yes    hemodynamically stable    Chest pain- L pleuritic ICD-10-CM: R07.9  ICD-9-CM: 786.50  3/6/2017 Yes    mild    Elevated lactic acid level ICD-10-CM: E87.2  ICD-9-CM: 276.2  3/6/2017 Yes    On admission    Hypoxemia ICD-10-CM: R09.02  ICD-9-CM: 799.02  3/6/2017 Yes    Resolved--on room air          Plan:  (Medical Decision Making)     --Zithromax, Rocephin day 2  --Blood cultures: no growth 2 days-pending  --Mucinex  --Hemoglobin low 8.0--trending up  --Urine for Legionella-pending  --WBC down to 14.9--was 23    More than 50% of the time documented was spent in face-to-face contact with the patient and in the care of the patient on the floor/unit where the patient is located.     Liam Guerra NP

## 2017-03-08 NOTE — PROGRESS NOTES
Patient transferred to 8th floor from ICU. She is uninsured and states that she was unable to get her antibiotics at discharge from the ED when she initially presented. She returned and was admitted 24 hours later. Case Management has discussed this case with the attending and we will assure that patient is able to get PO antibiotics when ready for discharge. She will either receive one of the antibiotics that is free at [facility] (she has an allergy to Amoxicillin), or if another antibiotic is indicated, we will provide a voucher for the antibiotic from our outpatient pharmacy. Patient is currently followed at [facility] and will follow-up there post-hospitalization. Case Management will continue to follow.     Care Management Interventions  Transition of Care Consult (CM Consult): Discharge Planning  Discharge Durable Medical Equipment: No  Physical Therapy Consult: No  Occupational Therapy Consult: No  Speech Therapy Consult: No  Current Support Network: Own Home  Confirm Follow Up Transport: Family  Plan discussed with Pt/Family/Caregiver: Yes  Freedom of Choice Offered: Yes  Discharge Location  Discharge Placement: Home

## 2017-03-08 NOTE — PROGRESS NOTES
Bedside report received from [facility], [address] [facility]. Pt alert and talking on the phone.  phone at the bedside. Pt speaks English well enough to make needs and wishes known.

## 2017-03-08 NOTE — PROGRESS NOTES
Rounded with nurse and patient, Interpreting Services offered when needed.     [address] [facility]  [phone]

## 2017-03-08 NOTE — PROGRESS NOTES
Discharge papers given to pt, informed that [facility] will be picking her up at 2503

## 2017-03-08 NOTE — PROGRESS NOTES
Dual full body skin assessment completed by Donald Richardson RN and Sintia Phillips RN. Elbows intact without redness or breakdown. Sacrum intact without redness or breakdown. Heels intact without redness or breakdown. Tattoos only.

## 2017-03-08 NOTE — PROGRESS NOTES
Joanna Meyers from [facility] returns call and states that she will sendt  to help communicate with pt at this time for more specifics, and they are here on the floor at this time

## 2017-03-08 NOTE — DISCHARGE SUMMARY
Discharge Note    Carl Ku  Admission date:  3/6/2017  Discharge date:  3/8/2017     Admitting Diagnosis:  CAP (community acquired pneumonia)    Discharge Diagnoses:   Hospital Problems  Date Reviewed: 3/8/2017          Codes Class Noted POA    * (Principal)CAP (community acquired pneumonia) ICD-10-CM: J18.9  ICD-9-CM: 333  3/6/2017 Yes        Leukocytosis ICD-10-CM: Z69.359  ICD-9-CM: 288.60  3/6/2017 Yes        Sepsis ([address] ICD-10-CM: A41.9  ICD-9-CM: 038.9, 995.91  3/6/2017 Yes        Chest pain- L pleuritic ICD-10-CM: R07.9  ICD-9-CM: 786.50  3/6/2017 Yes        Elevated lactic acid level ICD-10-CM: E87.2  ICD-9-CM: 276.2  3/6/2017 Yes        Hypoxemia ICD-10-CM: R09.02  ICD-9-CM: 799.02  3/6/2017 Yes              Consultants:  None    Studies/Procedures:  CXR    Condition on Discharge:  stable    Disposition:  home      Presenting Illness:  51 y.o.  female who was here 2 days ago and found to have a LLL pneumonia. She was prescribed a Zpak but did not have the money to afford it. She has worsened, presents back with increased dyspnea, hemoptysis, and severe chest pain. She is also very tachycardic but has a stable BP. Her CXR shows some progression. She is now admitted to the ICU for pneumonia with sepsis and could have empyema given the severity of pain. Her LA is 2.0 and WBC 17.1. She is not having diffuse pain and her influenza swab was negative 2 days ago. Repeat flu negative. Hospital course:  Was placed on antibiotics, steroids and nebs. Was admitted to the ICU and continued on IVFs for hypotension and brief required Levophed. Tachycardia resolved and weaned off pressor support. Was moved to the floor and weaned off O2. Symptoms have improved, she is ambulating in the room without problems and she is now ready for discharge. She is followed at the [facility] and will follow up there for CXR in 4 weeks.   Will be discharged home on Cipro for 6 more days--this can be obtained for free at [address] [place] [address] and she is made aware. Physical Exam:   Constitution: the patient is well developed and in no acute distress, room air  EENMT: Sclera clear, pupils equal, oral mucosa moist  Respiratory: no wheezing, occasional productive cough with scant sputum  Cardiovascular: RRR without M,G,R  Gastrointestinal: soft and non-tender; with positive bowel sounds. Musculoskeletal: warm without cyanosis. There is no lower leg edema. Skin: no jaundice or rashes, no wounds   Neurologic: no gross neuro deficits     Psychiatric: alert and oriented x 3    LAB  Recent Labs      03/08/17   0712  03/07/17   0435  03/06/17   0600   WBC  14.9*  23.2*  17.3*   HGB  8.0*  7.5*  7.6*   HCT  26.4*  25.3*  25.6*   PLT  294  257  217   INR   --    --   1.1     Recent Labs      03/07/17   0435  03/06/17   0042   NA  143  140   K  3.9  3.8   CL  111*  105   CO2  25  23   BUN  12  9   CREA  0.63  0.94   ALB  2.2*  3.2*     Recent Labs      03/06/17   0242   PH  7.40   PCO2  34*   PO2  61*   HCO3  20*       Discharge Medications:   Current Discharge Medication List      START taking these medications    Details   HYDROcodone-acetaminophen (NORCO) 5-325 mg per tablet Take 1 Tab by mouth every six (6) hours as needed for Pain. Max Daily Amount: 4 Tabs. Qty: 10 Tab, Refills: 0      ciprofloxacin HCl (CIPRO) 500 mg tablet Take 1 Tab by mouth every twelve (12) hours for 6 days. Qty: 12 Tab, Refills: 0         CONTINUE these medications which have NOT CHANGED    Details   predniSONE (DELTASONE) 10 mg tablet Take 1 Tab by mouth daily (with breakfast). 5x3d, 4x3d, 3x3d, 2x3d, 1x3d  Qty: 45 Tab, Refills: 0      guaiFENesin (MUCINEX) 1,200 mg Ta12 ER tablet Take 1 Tab by mouth two (2) times a day. Qty: 28 Tab, Refills: 0             Followup/Outpt Studies:  --[facility] with [facility].   --Continue antibiotics course with Cipro for 6 more days--she is to obtain a free prescription at [facility]. --Total discharge greater than 30 minutes in duration. More than 50% of the time documented was spent in face-to-face contact with the patient and in the care of the patient on the floor/unit where the patient is located. Liam Guerra NP  Lungs:   Some crackles on the left  Heart:  RRR with no Murmur/Rubs/Gallops    Additional Comments:  Home on [facility]    I have spoken with and examined the patient. I agree with the above assessment and plan as documented.     Russel Hernández MD

## 2017-03-09 LAB
C TRACH RRNA SPEC QL NAA+PROBE: POSITIVE
N GONORRHOEA RRNA SPEC QL NAA+PROBE: NEGATIVE
SPECIMEN SOURCE: ABNORMAL

## 2017-03-11 LAB
BACTERIA SPEC CULT: NORMAL
BACTERIA SPEC CULT: NORMAL
SERVICE CMNT-IMP: NORMAL
SERVICE CMNT-IMP: NORMAL

## 2017-03-26 ENCOUNTER — APPOINTMENT (OUTPATIENT)
Dept: GENERAL RADIOLOGY | Age: 47
End: 2017-03-26
Attending: NURSE PRACTITIONER
Payer: SELF-PAY

## 2017-03-26 ENCOUNTER — HOSPITAL ENCOUNTER (EMERGENCY)
Age: 47
Discharge: HOME | End: 2017-03-26
Attending: EMERGENCY MEDICINE
Payer: SELF-PAY

## 2017-03-26 VITALS
SYSTOLIC BLOOD PRESSURE: 135 MMHG | OXYGEN SATURATION: 98 % | BODY MASS INDEX: 27.97 KG/M2 | DIASTOLIC BLOOD PRESSURE: 74 MMHG | RESPIRATION RATE: 16 BRPM | HEIGHT: 62 IN | TEMPERATURE: 98.4 F | HEART RATE: 94 BPM | WEIGHT: 152 LBS

## 2017-03-26 DIAGNOSIS — S00.33XA NASAL CONTUSION: Primary | ICD-10-CM

## 2017-03-26 PROCEDURE — 74011250637 HC RX REV CODE- 250/637: Performed by: NURSE PRACTITIONER

## 2017-03-26 PROCEDURE — 70160 X-RAY EXAM OF NASAL BONES: CPT

## 2017-03-26 PROCEDURE — 99283 EMERGENCY DEPT VISIT LOW MDM: CPT | Performed by: NURSE PRACTITIONER

## 2017-03-26 RX ORDER — ACETAMINOPHEN 325 MG/1
650 TABLET ORAL
Status: COMPLETED | OUTPATIENT
Start: 2017-03-26 | End: 2017-03-26

## 2017-03-26 RX ADMIN — ACETAMINOPHEN 650 MG: 325 TABLET, FILM COATED ORAL at 12:04

## 2017-03-26 NOTE — ED TRIAGE NOTES
Pt states she fell yesterday and has pain to her face. Pt states she fell down about 3 stairs. Pt denies any loc.

## 2017-03-26 NOTE — ED PROVIDER NOTES
HPI Comments: Patient states she was walking down the stairs 2 days ago when she slipped and fell. She states he hit her nose on the steps. She has noted swelling and bruising to her nose. She denies pain anywhere else on her face. She denies LOC. She denies difficulty with breathing. Patient is a 51 y.o. female presenting with fall. The history is provided by the patient. Fall   The accident occurred more than 2 days ago. The fall occurred while walking. She fell from a height of ground level. She landed on hard floor. There was no blood loss. The point of impact was the head. The pain is present in the head. The pain is at a severity of 7/10. The pain is mild. She was ambulatory at the scene. There was no entrapment after the fall. There was no drug use involved in the accident. Pertinent negatives include no visual change, no fever, no numbness, no abdominal pain, no bowel incontinence, no nausea, no vomiting, no hematuria, no headaches, no extremity weakness, no hearing loss, no loss of consciousness, no tingling and no laceration. The symptoms are aggravated by pressure on injury. She has tried nothing for the symptoms. Past Medical History:   Diagnosis Date    HTN (hypertension) 4/12/2013    Hypertension     no meds    Missed ab     6wks    Other ill-defined conditions(799.89)     kidney stones    Sepsis ([facility] [address] 3/6/2017       History reviewed. No pertinent surgical history. History reviewed. No pertinent family history. Social History     Social History    Marital status: SINGLE     Spouse name: N/A    Number of children: N/A    Years of education: N/A     Occupational History    Not on file.      Social History Main Topics    Smoking status: Former Smoker    Smokeless tobacco: Never Used    Alcohol use No    Drug use: No    Sexual activity: No     Other Topics Concern    Not on file     Social History Narrative         ALLERGIES: Amoxicillin and Morphine    Review of Systems Constitutional: Negative for chills and fever. HENT: Negative for congestion, nosebleeds, postnasal drip and rhinorrhea. Respiratory: Negative for cough and shortness of breath. Gastrointestinal: Negative for abdominal pain, bowel incontinence, nausea and vomiting. Genitourinary: Negative for hematuria. Musculoskeletal: Negative for extremity weakness. Skin: Positive for color change. Neurological: Negative for tingling, loss of consciousness, numbness and headaches. Vitals:    03/26/17 1131 03/26/17 1312   BP: 123/73 135/74   Pulse: 100 94   Resp: 18 16   Temp: 98 °F (36.7 °C) 98.4 °F (36.9 °C)   SpO2: 97% 98%   Weight: 68.9 kg (152 lb)    Height: 5' 2\" (1.575 m)             Physical Exam   Constitutional: She is oriented to person, place, and time. She appears well-developed and well-nourished. No distress. HENT:   Nose: No nasal deformity. No epistaxis. No foreign bodies. Right sinus exhibits no maxillary sinus tenderness and no frontal sinus tenderness. Left sinus exhibits no maxillary sinus tenderness and no frontal sinus tenderness. Bruising and swelling      Eyes: Conjunctivae and EOM are normal. Pupils are equal, round, and reactive to light. Neck: Normal range of motion and full passive range of motion without pain. Neck supple. No spinous process tenderness and no muscular tenderness present. No rigidity. No edema, no erythema and normal range of motion present. Cardiovascular: Normal rate and regular rhythm. No murmur heard. Pulmonary/Chest: Effort normal and breath sounds normal. No respiratory distress. She has no wheezes. Abdominal: Soft. Bowel sounds are normal. She exhibits no distension. There is no tenderness. Musculoskeletal: Normal range of motion. Neurological: She is alert and oriented to person, place, and time. Skin: Skin is warm, dry and intact. Bruising and ecchymosis noted. No laceration noted. She is not diaphoretic. There is erythema. Psychiatric: She has a normal mood and affect. Her behavior is normal.   Nursing note and vitals reviewed. XR NASAL BONES MIN 3 V (Final result) Result time: 03/26/17 12:35:48     Final result by Diane Edwards MD (03/26/17 12:35:48)     Impression:     Impression:  No evidence of acute injury.         Narrative:     Exam:  Nasal bones radiographs    History:  pain, pain and swelling after fall, 51 years Female     Comparison: None available    Findings:  No evidence of acute fracture or dislocation.  Normal alignment,  joint spaces preserved.  Normal mineralization.  The anterior nasal bone appears  intact.  There is no evidence of air-fluid level in the paranasal sinuses to  suggest acute facial fracture.  Visualized soft tissues otherwise unremarkable. MDM  Number of Diagnoses or Management Options  Nasal contusion: new and does not require workup  Diagnosis management comments: Xray negative for fracture. Patient given PO tylenol. Patient discharged to ice the area and follow up as needed.         Amount and/or Complexity of Data Reviewed  Tests in the radiology section of CPT®: ordered and reviewed  Tests in the medicine section of CPT®: reviewed and ordered    Patient Progress  Patient progress: stable    ED Course       Procedures

## 2017-09-04 ENCOUNTER — HOSPITAL ENCOUNTER (EMERGENCY)
Age: 47
Discharge: HOME | End: 2017-09-04
Attending: EMERGENCY MEDICINE
Payer: SELF-PAY

## 2017-09-04 VITALS
DIASTOLIC BLOOD PRESSURE: 83 MMHG | TEMPERATURE: 97.8 F | OXYGEN SATURATION: 97 % | BODY MASS INDEX: 24.7 KG/M2 | HEART RATE: 105 BPM | RESPIRATION RATE: 20 BRPM | HEIGHT: 66 IN | WEIGHT: 153.7 LBS | SYSTOLIC BLOOD PRESSURE: 127 MMHG

## 2017-09-04 DIAGNOSIS — L02.91 ABSCESS: Primary | ICD-10-CM

## 2017-09-04 LAB
APPEARANCE UR: ABNORMAL
BILIRUB UR QL: ABNORMAL
COLOR UR: ABNORMAL
GLUCOSE UR STRIP.AUTO-MCNC: NEGATIVE MG/DL
HCG UR QL: NEGATIVE
HGB UR QL STRIP: NEGATIVE
KETONES UR QL STRIP.AUTO: ABNORMAL MG/DL
LEUKOCYTE ESTERASE UR QL STRIP.AUTO: NEGATIVE
NITRITE UR QL STRIP.AUTO: NEGATIVE
PH UR STRIP: 6 [PH] (ref 5–9)
PROT UR STRIP-MCNC: ABNORMAL MG/DL
SP GR UR REFRACTOMETRY: 1.03 (ref 1–1.02)
UROBILINOGEN UR QL STRIP.AUTO: 1 EU/DL (ref 0.2–1)

## 2017-09-04 PROCEDURE — 87086 URINE CULTURE/COLONY COUNT: CPT | Performed by: NURSE PRACTITIONER

## 2017-09-04 PROCEDURE — 81025 URINE PREGNANCY TEST: CPT | Performed by: NURSE PRACTITIONER

## 2017-09-04 PROCEDURE — 99283 EMERGENCY DEPT VISIT LOW MDM: CPT | Performed by: NURSE PRACTITIONER

## 2017-09-04 PROCEDURE — 81003 URINALYSIS AUTO W/O SCOPE: CPT | Performed by: NURSE PRACTITIONER

## 2017-09-04 RX ORDER — ACETAMINOPHEN AND CODEINE PHOSPHATE 300; 30 MG/1; MG/1
1 TABLET ORAL
Qty: 20 TAB | Refills: 0 | Status: SHIPPED | OUTPATIENT
Start: 2017-09-04 | End: 2017-09-07

## 2017-09-04 RX ORDER — SULFAMETHOXAZOLE AND TRIMETHOPRIM 800; 160 MG/1; MG/1
1 TABLET ORAL 2 TIMES DAILY
Qty: 14 TAB | Refills: 0 | Status: SHIPPED | OUTPATIENT
Start: 2017-09-04 | End: 2017-09-11

## 2017-09-04 NOTE — LETTER
[address] [facility] EMERGENCY DEPT [address] [address] 
[phone] Work/School Note Date: 9/4/2017 To Whom It May concern: 
 
Dieter Prince was seen and treated today in the emergency room by the following provider(s): 
Attending Provider: Summer Nesbitt MD 
Nurse Practitioner: Shade Figueroa NP. Deiter Prince may return to work on tomorrow. Sincerely, Shade Figueroa NP

## 2017-09-04 NOTE — ED PROVIDER NOTES
HPI Comments: 50 y/o f to ed with complaint of bumps on her vaginal area the last two days. No fever or chills, no n./v/d. No cough or congestion. No vag dc.  lmp august 10th. No other complaints    Patient is a 51 y.o. female presenting with skin problem. The history is provided by the patient. No  was used. Skin Problem    This is a new problem. The current episode started yesterday. The problem has not changed since onset. There has been no fever. Affected Location: right labia. The pain is moderate. The pain has been constant since onset. Associated symptoms include pain. Pertinent negatives include no blisters, no itching, no weeping and no hives. Past Medical History:   Diagnosis Date    HTN (hypertension) 4/12/2013    Hypertension     no meds    Missed ab     6wks    Other ill-defined conditions     kidney stones    Sepsis ([facility] [address] 3/6/2017       History reviewed. No pertinent surgical history. History reviewed. No pertinent family history. Social History     Social History    Marital status: SINGLE     Spouse name: N/A    Number of children: N/A    Years of education: N/A     Occupational History    Not on file. Social History Main Topics    Smoking status: Former Smoker    Smokeless tobacco: Never Used    Alcohol use No    Drug use: No    Sexual activity: No     Other Topics Concern    Not on file     Social History Narrative         ALLERGIES: Amoxicillin and Morphine    Review of Systems   Constitutional: Negative for chills and fever. HENT: Negative for facial swelling and mouth sores. Eyes: Negative for discharge and redness. Respiratory: Negative for cough and shortness of breath. Cardiovascular: Negative for chest pain and palpitations. Gastrointestinal: Negative for nausea and vomiting. Endocrine: Negative for cold intolerance and heat intolerance. Genitourinary: Positive for vaginal pain.  Negative for urgency, vaginal bleeding and vaginal discharge. Musculoskeletal: Negative for back pain and neck pain. Skin: Negative for itching, pallor and rash. Neurological: Negative for dizziness and seizures. Psychiatric/Behavioral: Negative for confusion and decreased concentration. Vitals:    09/04/17 1245   BP: 127/83   Pulse: (!) 105   Resp: 20   Temp: 97.8 °F (36.6 °C)   SpO2: 97%   Weight: 69.7 kg (153 lb 11.2 oz)   Height: 5' 6\" (1.676 m)            Physical Exam   Constitutional: She is oriented to person, place, and time. She appears well-developed and well-nourished. No distress. HENT:   Head: Normocephalic and atraumatic. Right Ear: External ear normal.   Left Ear: External ear normal.   Nose: Nose normal.   Eyes: Conjunctivae and EOM are normal. Pupils are equal, round, and reactive to light. Neck: Normal range of motion. Neck supple. Cardiovascular: Normal rate, regular rhythm and normal heart sounds. Pulmonary/Chest: Effort normal and breath sounds normal. No respiratory distress. She has no wheezes. Abdominal: Soft. Bowel sounds are normal. She exhibits no distension. There is no tenderness. Genitourinary:         Genitourinary Comments: Norbert MAGAÑA chaperone for external gu exam   Musculoskeletal: Normal range of motion. She exhibits no edema or tenderness. Neurological: She is alert and oriented to person, place, and time. No cranial nerve deficit. Coordination normal.   Skin: Skin is warm and dry. No rash noted. Psychiatric: She has a normal mood and affect. Her behavior is normal. Judgment and thought content normal.   Nursing note and vitals reviewed. MDM  Number of Diagnoses or Management Options  Diagnosis management comments: 52 y/o f to ed with complaint of bumps on her vaginal area the last two days. No fever or chills, no n./v/d. No cough or congestion. No vag dc.  lmp august 10th. No other complaints    On exam, two smaller than pea sized very hard areas noted to right labia.   No erythema, no drainage, but very tender. I do not think these are ready to be lanced as there is no fluctuance.    Will ck urine in lab as it appeared suspect to [employer], as well dc home with abx that will cover for early abscesses as well as uti, and pain control  3:15 PM    Urine dip neg       Amount and/or Complexity of Data Reviewed  Clinical lab tests: ordered and reviewed    Risk of Complications, Morbidity, and/or Mortality  Presenting problems: minimal  Diagnostic procedures: minimal  Management options: minimal    Patient Progress  Patient progress: stable    ED Course       Procedures

## 2017-09-04 NOTE — DISCHARGE INSTRUCTIONS
Absceso cutáneo: Instrucciones de cuidado - [ Skin Abscess: Care Instructions ]  Instrucciones de cuidado    Un absceso de la piel es charis infección bacteriana que forma charis bolsa de pus. Un forúnculo es charis especie de absceso de la piel. Puede que el médico haya hecho charis incisión en el absceso para que pueda drenar el pus. Jeremías vez tenga charis gasa en el joel para que el absceso se mantenga abierto y siga drenando. David Brawn necesite antibióticos. Deberá hacer un seguimiento con lee médico para asegurarse de que la infección haya desaparecido. El médico lo delacruz examinado minuciosamente, evans pueden desarrollarse problemas más tarde. Si nota algún problema o nuevos síntomas, busque tratamiento médico de inmediato. La atención de seguimiento es charis parte clave de lee tratamiento y seguridad. Asegúrese de hacer y acudir a todas las citas, y llame a lee médico si está teniendo problemas. También es charis buena idea saber los resultados de shivani exámenes y mantener charis lista de los medicamentos que eulalia. ¿Cómo puede cuidarse en el hogar? · Aplíquese compresas calientes y secas, charis almohadilla térmica ajustada a baja temperatura o charis bolsa de [place] 3 o 4 veces al día para el dolor. Mantenga un paño entre la jose de calor y la piel. · Si lee médico le recetó antibióticos, tómelos según las indicaciones. No deje de tomarlos solo porque se sienta mejor. Debe lucero todos los antibióticos hasta terminarlos. · [employer] analgésicos (medicamentos para el dolor) exactamente según las indicaciones. ¨ Si el médico le recetó analgésicos, tómelos según las indicaciones. ¨ Si no está tomando un analgésico recetado, pregúntele a lee médico si puede lucero un medicamento de [employer]. · Mantenga la venda limpia y seca. Cambie la venda cuando se moje o se ensucie, o por lo menos charis vez al día. · Si el absceso se taponó con gasa:  ¨ Cumpla con las citas de seguimiento para que le cambien o le quiten la gasa.  Si el [employer] indicó que se quitara la gasa, retire toda la gasa suavemente cuando el médico le diga que lo lily. ¨ Después de quitar la gasa, empape la zhen con [place] de 15 a 20 minutos 2 veces al día, hasta que la herida se cierre. ¿Cuándo debe pedir ayuda? Llame a lee médico ahora mismo o busque atención médica inmediata si:  · Tiene señales de charis infección que está empeorando, tales negrito:  ¨ Aumento del dolor, la hinchazón, la temperatura o el enrojecimiento. ¨ Vetas rojizas que emanan de la piel infectada. ¨ Pus que supura de la herida. Brit Block. Vigile de cerca los cambios en lee jelly y asegúrese de comunicarse con lee médico si:  · No mejora negrito se esperaba. ¿Dónde puede encontrar más información en inglés? Marvin Ferrer a http://yasmin-carly.info/. Corinne Otto U723 en la búsqueda para aprender más acerca de \"Absceso cutáneo: Instrucciones de cuidado - [ Skin Abscess: Care Instructions ]. \"  Revisado: 13 octubre, 2016  Versión del contenido: 11.3  © 7048-8425 Healthwise, Incorporated. Las instrucciones de cuidado fueron adaptadas bajo licencia por [employer] (which disclaims liability or warranty for this information). Si usted tiene [place] afección médica o sobre estas instrucciones, siempre pregunte a lee profesional de jelly. Healthwise, Incorporated niega toda garantía o responsabilidad por lee uso de esta información.

## 2017-09-06 LAB
BACTERIA SPEC CULT: NORMAL
SERVICE CMNT-IMP: NORMAL

## 2017-09-07 ENCOUNTER — HOSPITAL ENCOUNTER (EMERGENCY)
Age: 47
Discharge: HOME | End: 2017-09-07
Attending: EMERGENCY MEDICINE
Payer: SELF-PAY

## 2017-09-07 VITALS
TEMPERATURE: 98.4 F | OXYGEN SATURATION: 99 % | RESPIRATION RATE: 20 BRPM | HEART RATE: 93 BPM | SYSTOLIC BLOOD PRESSURE: 143 MMHG | HEIGHT: 66 IN | BODY MASS INDEX: 24.59 KG/M2 | DIASTOLIC BLOOD PRESSURE: 94 MMHG | WEIGHT: 153 LBS

## 2017-09-07 DIAGNOSIS — N76.4 LABIAL ABSCESS: Primary | ICD-10-CM

## 2017-09-07 PROCEDURE — 99283 EMERGENCY DEPT VISIT LOW MDM: CPT | Performed by: EMERGENCY MEDICINE

## 2017-09-07 PROCEDURE — 75810000289 HC I&D ABSCESS SIMP/COMP/MULT: Performed by: EMERGENCY MEDICINE

## 2017-09-07 PROCEDURE — 74011250637 HC RX REV CODE- 250/637: Performed by: EMERGENCY MEDICINE

## 2017-09-07 RX ORDER — HYDROCODONE BITARTRATE AND ACETAMINOPHEN 5; 325 MG/1; MG/1
1-2 TABLET ORAL
Qty: 19 TAB | Refills: 0 | Status: SHIPPED | OUTPATIENT
Start: 2017-09-07 | End: 2017-09-14

## 2017-09-07 RX ORDER — HYDROCODONE BITARTRATE AND ACETAMINOPHEN 5; 325 MG/1; MG/1
2 TABLET ORAL ONCE
Status: COMPLETED | OUTPATIENT
Start: 2017-09-07 | End: 2017-09-07

## 2017-09-07 RX ADMIN — HYDROCODONE BITARTRATE AND ACETAMINOPHEN 2 TABLET: 5; 325 TABLET ORAL at 13:21

## 2017-09-07 NOTE — ED TRIAGE NOTES
51year old  female presents for worsening abscess to inguinal area with pain. Pt. States she does not have pain medication. Reports chills last night, fever, nausea or vomiting. Reports compliance with antibiotics. No I&D performed on 9/4/2017.

## 2017-09-07 NOTE — ED PROVIDER NOTES
Patient is a 51 y.o. female presenting with abscess and female genitourinary complaint. The history is provided by the patient. The history is limited by a language barrier. A  was used. Abscess    This is a recurrent problem. The current episode started more than 2 days ago. The problem has been gradually worsening. There has been no fever. Affected Location: right labia. The pain is moderate. The pain has been constant since onset. Associated symptoms include pain and weeping. Treatments tried: patient prescribed Bactrim and Tylenol with Codeine at her last ER visit, states she just filled the antibiotics yesterday. States that the Tylenol codeine was \"too expensive\" The treatment provided no relief. Vaginal Pain    Pertinent negatives include no fever, no abdominal pain, no constipation, no diarrhea, no vomiting and no dysuria. Past Medical History:   Diagnosis Date    HTN (hypertension) 4/12/2013    Hypertension     no meds    Missed ab     6wks    Other ill-defined conditions     kidney stones    Sepsis ([facility] [address] 3/6/2017       History reviewed. No pertinent surgical history. History reviewed. No pertinent family history. Social History     Social History    Marital status: SINGLE     Spouse name: N/A    Number of children: N/A    Years of education: N/A     Occupational History    Not on file. Social History Main Topics    Smoking status: Former Smoker    Smokeless tobacco: Never Used    Alcohol use No    Drug use: No    Sexual activity: No     Other Topics Concern    Not on file     Social History Narrative         ALLERGIES: Amoxicillin and Morphine    Review of Systems   Constitutional: Negative for chills and fever. HENT: Negative for congestion, ear pain and rhinorrhea. Eyes: Negative for photophobia and discharge. Respiratory: Negative for cough and shortness of breath. Cardiovascular: Negative for chest pain and palpitations. Gastrointestinal: Negative for abdominal pain, constipation, diarrhea and vomiting. Endocrine: Negative for cold intolerance and heat intolerance. Genitourinary: Negative for dysuria and flank pain. Musculoskeletal: Negative for arthralgias, myalgias and neck pain. Skin: Negative for rash and wound. Allergic/Immunologic: Negative for environmental allergies and food allergies. Neurological: Negative for syncope and headaches. Hematological: Negative for adenopathy. Does not bruise/bleed easily. Psychiatric/Behavioral: Negative for dysphoric mood. The patient is not nervous/anxious. All other systems reviewed and are negative. Vitals:    09/07/17 1042   BP: (!) 143/94   Pulse: 93   Resp: 20   Temp: 98.4 °F (36.9 °C)   SpO2: 99%   Weight: 69.4 kg (153 lb)   Height: 5' 6\" (1.676 m)            Physical Exam   Constitutional: She is oriented to person, place, and time. She appears well-developed and well-nourished. She appears distressed. HENT:   Head: Normocephalic and atraumatic. Mouth/Throat: Oropharynx is clear and moist.   Eyes: Conjunctivae and EOM are normal. Pupils are equal, round, and reactive to light. Right eye exhibits no discharge. Left eye exhibits no discharge. No scleral icterus. Neck: Normal range of motion. Neck supple. No thyromegaly present. Cardiovascular: Normal rate, regular rhythm, normal heart sounds and intact distal pulses. Exam reveals no gallop and no friction rub. No murmur heard. Pulmonary/Chest: Effort normal and breath sounds normal. No respiratory distress. She has no wheezes. She exhibits no tenderness. Abdominal: Soft. Bowel sounds are normal. She exhibits no distension. There is no hepatosplenomegaly. There is no tenderness. There is no rebound and no guarding. No hernia. Musculoskeletal: Normal range of motion. She exhibits no edema or tenderness. Neurological: She is alert and oriented to person, place, and time.  No cranial nerve deficit. She exhibits normal muscle tone. Skin: Skin is warm. No rash noted. She is not diaphoretic. No erythema. Psychiatric: She has a normal mood and affect. Her behavior is normal. Judgment and thought content normal.   Nursing note and vitals reviewed. MDM  Number of Diagnoses or Management Options  Labial abscess: established and worsening  Diagnosis management comments: 79-year-old female with 3 discrete abscesses of the right labia    I&D done ×2           Amount and/or Complexity of Data Reviewed  Tests in the medicine section of CPT®: ordered and reviewed  Review and summarize past medical records: yes    Risk of Complications, Morbidity, and/or Mortality  Presenting problems: moderate  Diagnostic procedures: moderate  Management options: moderate  General comments: Elements of this note have been dictated via voice recognition software. Text and phrases may be limited by the accuracy of the software. The chart has been reviewed, but errors may still be present. Patient Progress  Patient progress: improved    ED Course       I&D Abcess Complex  Date/Time: 9/7/2017 1:22 PM  Performed by: RAMON DICKSON  Authorized by: Deep STILL     Consent:     Consent obtained:  Verbal    Consent given by:  Patient    Risks discussed:  Bleeding, infection and pain  Location:     Type:  Abscess    Location:  Anogenital    Anogenital location:  Vulva  Pre-procedure details:     Skin preparation:  Betadine  Anesthesia (see MAR for exact dosages): Anesthesia method:  Local infiltration    Local anesthetic:  Lidocaine 1% w/o epi  Procedure type:     Complexity:  Complex  Procedure details:     Needle aspiration: no      Incision type: 2. Straight incisions, one superior and one inferior. Scalpel blade:  11    Wound management:  Probed and deloculated and extensive cleaning    Drainage:  Purulent    Drainage amount:   Moderate    Wound treatment:  Wound left open    Packing materials: None  Post-procedure details:     Patient tolerance of procedure:   Tolerated with difficulty

## 2017-09-07 NOTE — ED TRIAGE NOTES
Pt arrive c/o vaginal abscess, pt was seen Monday for same thing. States \"there is pain and it exploded today. \" denies any fever. Pt states they did not haresh the abscess on Monday.

## 2017-09-08 PROBLEM — Z91.199 MEDICAL NON-COMPLIANCE: Status: ACTIVE | Noted: 2017-09-08

## 2017-09-13 ENCOUNTER — APPOINTMENT (OUTPATIENT)
Dept: GENERAL RADIOLOGY | Age: 47
End: 2017-09-13
Attending: NURSE PRACTITIONER
Payer: SELF-PAY

## 2017-09-13 ENCOUNTER — APPOINTMENT (OUTPATIENT)
Dept: ULTRASOUND IMAGING | Age: 47
End: 2017-09-13
Attending: EMERGENCY MEDICINE
Payer: SELF-PAY

## 2017-09-13 ENCOUNTER — HOSPITAL ENCOUNTER (EMERGENCY)
Age: 47
Discharge: HOME | End: 2017-09-14
Attending: EMERGENCY MEDICINE
Payer: SELF-PAY

## 2017-09-13 DIAGNOSIS — K82.9 GALLBLADDER DISEASE: Primary | ICD-10-CM

## 2017-09-13 LAB
ALBUMIN SERPL-MCNC: 3.6 G/DL (ref 3.5–5)
ALBUMIN/GLOB SERPL: 0.8 {RATIO} (ref 1.2–3.5)
ALP SERPL-CCNC: 84 U/L (ref 50–136)
ALT SERPL-CCNC: 18 U/L (ref 12–65)
ANION GAP SERPL CALC-SCNC: 9 MMOL/L (ref 7–16)
AST SERPL-CCNC: 16 U/L (ref 15–37)
BASOPHILS # BLD: 0.1 K/UL (ref 0–0.2)
BASOPHILS NFR BLD: 2 % (ref 0–2)
BILIRUB SERPL-MCNC: 0.2 MG/DL (ref 0.2–1.1)
BUN SERPL-MCNC: 11 MG/DL (ref 6–23)
CALCIUM SERPL-MCNC: 9 MG/DL (ref 8.3–10.4)
CHLORIDE SERPL-SCNC: 104 MMOL/L (ref 98–107)
CO2 SERPL-SCNC: 27 MMOL/L (ref 21–32)
CREAT SERPL-MCNC: 0.9 MG/DL (ref 0.6–1)
D DIMER PPP FEU-MCNC: 0.48 UG/ML(FEU)
DIFFERENTIAL METHOD BLD: ABNORMAL
EOSINOPHIL # BLD: 0.4 K/UL (ref 0–0.8)
EOSINOPHIL NFR BLD: 5 % (ref 0.5–7.8)
ERYTHROCYTE [DISTWIDTH] IN BLOOD BY AUTOMATED COUNT: 16.2 % (ref 11.9–14.6)
GLOBULIN SER CALC-MCNC: 4.7 G/DL (ref 2.3–3.5)
GLUCOSE SERPL-MCNC: 92 MG/DL (ref 65–100)
HCT VFR BLD AUTO: 37.6 % (ref 35.8–46.3)
HGB BLD-MCNC: 12 G/DL (ref 11.7–15.4)
IMM GRANULOCYTES # BLD: 0 K/UL (ref 0–0.5)
IMM GRANULOCYTES NFR BLD: 0.3 % (ref 0–5)
LIPASE SERPL-CCNC: 192 U/L (ref 73–393)
LYMPHOCYTES # BLD: 2.9 K/UL (ref 0.5–4.6)
LYMPHOCYTES NFR BLD: 34 % (ref 13–44)
MCH RBC QN AUTO: 25.2 PG (ref 26.1–32.9)
MCHC RBC AUTO-ENTMCNC: 31.9 G/DL (ref 31.4–35)
MCV RBC AUTO: 79 FL (ref 79.6–97.8)
MONOCYTES # BLD: 0.6 K/UL (ref 0.1–1.3)
MONOCYTES NFR BLD: 7 % (ref 4–12)
NEUTS SEG # BLD: 4.6 K/UL (ref 1.7–8.2)
NEUTS SEG NFR BLD: 52 % (ref 43–78)
PLATELET # BLD AUTO: 459 K/UL (ref 150–450)
PMV BLD AUTO: 11.6 FL (ref 10.8–14.1)
POTASSIUM SERPL-SCNC: 4.7 MMOL/L (ref 3.5–5.1)
PROT SERPL-MCNC: 8.3 G/DL (ref 6.3–8.2)
RBC # BLD AUTO: 4.76 M/UL (ref 4.05–5.25)
SODIUM SERPL-SCNC: 140 MMOL/L (ref 136–145)
TROPONIN I SERPL-MCNC: <0.02 NG/ML (ref 0.02–0.05)
WBC # BLD AUTO: 8.7 K/UL (ref 4.3–11.1)

## 2017-09-13 PROCEDURE — 84484 ASSAY OF TROPONIN QUANT: CPT | Performed by: NURSE PRACTITIONER

## 2017-09-13 PROCEDURE — 99283 EMERGENCY DEPT VISIT LOW MDM: CPT | Performed by: EMERGENCY MEDICINE

## 2017-09-13 PROCEDURE — 85025 COMPLETE CBC W/AUTO DIFF WBC: CPT | Performed by: NURSE PRACTITIONER

## 2017-09-13 PROCEDURE — 85379 FIBRIN DEGRADATION QUANT: CPT | Performed by: NURSE PRACTITIONER

## 2017-09-13 PROCEDURE — 83690 ASSAY OF LIPASE: CPT | Performed by: EMERGENCY MEDICINE

## 2017-09-13 PROCEDURE — 76705 ECHO EXAM OF ABDOMEN: CPT

## 2017-09-13 PROCEDURE — 71020 XR CHEST PA LAT: CPT

## 2017-09-13 PROCEDURE — 80053 COMPREHEN METABOLIC PANEL: CPT | Performed by: NURSE PRACTITIONER

## 2017-09-13 PROCEDURE — 93005 ELECTROCARDIOGRAM TRACING: CPT | Performed by: NURSE PRACTITIONER

## 2017-09-13 NOTE — ED TRIAGE NOTES
Pt states chest pain that started last night with shortness of breath. Pt describes the pain as a stabbing.

## 2017-09-13 NOTE — ED TRIAGE NOTES
Montse Moore is a 51 y.o. female here for chest pain and shortness of breath that started last night. She states pain is under her left breast. She denies cough and congestion. She states pain increases when she takes a deep breath. Respirations are even and non labored she is in no acute distress. . Rapid assessment performed. --- Orders were placed. --- Patient will be placed in the waiting room.    Signed By: INGRID Renee     September 13, 2017

## 2017-09-14 VITALS
BODY MASS INDEX: 28.16 KG/M2 | HEART RATE: 85 BPM | TEMPERATURE: 98.4 F | WEIGHT: 153 LBS | RESPIRATION RATE: 22 BRPM | SYSTOLIC BLOOD PRESSURE: 134 MMHG | DIASTOLIC BLOOD PRESSURE: 80 MMHG | OXYGEN SATURATION: 99 % | HEIGHT: 62 IN

## 2017-09-14 LAB
ATRIAL RATE: 82 BPM
CALCULATED P AXIS, ECG09: 68 DEGREES
CALCULATED R AXIS, ECG10: 48 DEGREES
CALCULATED T AXIS, ECG11: 45 DEGREES
DIAGNOSIS, 93000: NORMAL
P-R INTERVAL, ECG05: 140 MS
Q-T INTERVAL, ECG07: 366 MS
QRS DURATION, ECG06: 68 MS
QTC CALCULATION (BEZET), ECG08: 427 MS
VENTRICULAR RATE, ECG03: 82 BPM

## 2017-09-14 RX ORDER — TRAMADOL HYDROCHLORIDE 50 MG/1
50 TABLET ORAL
Qty: 12 TAB | Refills: 0 | Status: SHIPPED | OUTPATIENT
Start: 2017-09-14 | End: 2017-10-30

## 2017-09-14 RX ORDER — ONDANSETRON 4 MG/1
4 TABLET, ORALLY DISINTEGRATING ORAL
Qty: 12 TAB | Refills: 0 | Status: SHIPPED | OUTPATIENT
Start: 2017-09-14 | End: 2018-04-24

## 2017-09-14 NOTE — ED PROVIDER NOTES
HPI Comments: Patient is a 48-year-old female who is coming in with lower chest pain as well as some right upper quadrant pain. She states it started yesterday and is come and went. She denies any shortness of breath. She also denies any current pain. Patient has no prior cardiac disease states she still has her gallbladder. Patient is a 51 y.o. female presenting with chest pain. The history is provided by the patient. The history is limited by a language barrier. A  was used. Chest Pain (Angina)    Pertinent negatives include no abdominal pain, no cough, no fever, no nausea, no palpitations, no shortness of breath and no vomiting. Past Medical History:   Diagnosis Date    HTN (hypertension) 4/12/2013    Hypertension     no meds    Medical non-compliance 9/8/2017    Missed ab     6wks    Other ill-defined conditions     kidney stones    Sepsis ([facility] [address] 3/6/2017       History reviewed. No pertinent surgical history. History reviewed. No pertinent family history. Social History     Social History    Marital status: SINGLE     Spouse name: N/A    Number of children: N/A    Years of education: N/A     Occupational History    Not on file. Social History Main Topics    Smoking status: Former Smoker    Smokeless tobacco: Never Used    Alcohol use No    Drug use: No    Sexual activity: No     Other Topics Concern    Not on file     Social History Narrative         ALLERGIES: Amoxicillin and Morphine    Review of Systems   Constitutional: Negative for chills and fever. Respiratory: Negative for cough, chest tightness, shortness of breath, wheezing and stridor. Cardiovascular: Positive for chest pain. Negative for palpitations. Gastrointestinal: Negative for abdominal pain, diarrhea, nausea and vomiting. Skin: Negative. All other systems reviewed and are negative.       Vitals:    09/13/17 1844   BP: 138/85   Pulse: 88   Resp: 26   Temp: 98.4 °F (36.9 °C) SpO2: 99%   Weight: 69.4 kg (153 lb)   Height: 5' 2\" (1.575 m)            Physical Exam   Constitutional: She is oriented to person, place, and time. She appears well-developed and well-nourished. No distress. HENT:   Head: Normocephalic and atraumatic. Eyes: Conjunctivae are normal. No scleral icterus. Neck: Normal range of motion. Neck supple. Cardiovascular: Normal rate, regular rhythm, normal heart sounds and intact distal pulses. Pulmonary/Chest: Effort normal and breath sounds normal. No stridor. No respiratory distress. She has no wheezes. She has no rales. She exhibits no tenderness. Abdominal: Soft. Bowel sounds are normal. She exhibits no distension. There is no tenderness. There is no rebound and no guarding. Neurological: She is alert and oriented to person, place, and time. No cranial nerve deficit. Coordination normal.   No focal weakness   Skin: Skin is warm and dry. No rash noted. She is not diaphoretic. No erythema. No pallor. Psychiatric: She has a normal mood and affect. Her behavior is normal. Judgment and thought content normal.   Nursing note and vitals reviewed. MDM  Number of Diagnoses or Management Options  Gallbladder disease:   Diagnosis management comments: Patient in no distress sleeping when I went to reevaluate her when she woke up she states that she was feeling better. Her workup has been fairly unremarkable lab wise her ultrasound shows no gallstones but there is some sludging contracted gallbladder and we'll refer her to surgery for follow-up. Fred Beavers MD; 9/14/2017 @12:21 AM Voice dictation software was used during the making of this note. This software is not perfect and grammatical and other typographical errors may be present.   This note has not been proofread for errors.  ===================================================================        Amount and/or Complexity of Data Reviewed  Clinical lab tests: ordered and reviewed (Results for orders placed or performed during the hospital encounter of 09/13/17  -CBC WITH AUTOMATED DIFF       Result                                            Value                         Ref Range                       WBC                                               8.7                           4.3 - 11.1 K/uL                 RBC                                               4.76                          4.05 - 5.25 M/uL                HGB                                               12.0                          11.7 - 15.4 g/dL                HCT                                               37.6                          35.8 - 46.3 %                   MCV                                               79.0 (L)                      79.6 - 97.8 FL                  MCH                                               25.2 (L)                      26.1 - 32.9 PG                  MCHC                                              31.9                          31.4 - 35.0 g/dL                RDW                                               16.2 (H)                      11.9 - 14.6 %                   PLATELET                                          459 (H)                       150 - 450 K/uL                  MPV                                               11.6                          10.8 - 14.1 FL                  DF                                                AUTOMATED                                                     NEUTROPHILS                                       52                            43 - 78 %                       LYMPHOCYTES                                       34                            13 - 44 %                       MONOCYTES                                         7                             4.0 - 12.0 %                    EOSINOPHILS                                       5                             0.5 - 7.8 %                     BASOPHILS                                         2 0.0 - 2.0 %                     IMMATURE GRANULOCYTES                             0.3                           0.0 - 5.0 %                     ABS. NEUTROPHILS                                  4.6                           1.7 - 8.2 K/UL                  ABS. LYMPHOCYTES                                  2.9                           0.5 - 4.6 K/UL                  ABS. MONOCYTES                                    0.6                           0.1 - 1.3 K/UL                  ABS. EOSINOPHILS                                  0.4                           0.0 - 0.8 K/UL                  ABS. BASOPHILS                                    0.1                           0.0 - 0.2 K/UL                  ABS. IMM.  GRANS.                                  0.0                           0.0 - 0.5 K/UL             -METABOLIC PANEL, COMPREHENSIVE       Result                                            Value                         Ref Range                       Sodium                                            140                           136 - 145 mmol/L                Potassium                                         4.7                           3.5 - 5.1 mmol/L                Chloride                                          104                           98 - 107 mmol/L                 CO2                                               27                            21 - 32 mmol/L                  Anion gap                                         9                             7 - 16 mmol/L                   Glucose                                           92                            65 - 100 mg/dL                  BUN                                               11                            6 - 23 MG/DL                    Creatinine                                        0.90                          0.6 - 1.0 MG/DL                 GFR est AA                                        >60 >60 ml/min/1.73m2               GFR est non-AA                                    >60                           >60 ml/min/1.73m2               Calcium                                           9.0                           8.3 - 10.4 MG/DL                Bilirubin, total                                  0.2                           0.2 - 1.1 MG/DL                 ALT (SGPT)                                        18                            12 - 65 U/L                     AST (SGOT)                                        16                            15 - 37 U/L                     Alk. phosphatase                                  84                            50 - 136 U/L                    Protein, total                                    8.3 (H)                       6.3 - 8.2 g/dL                  Albumin                                           3.6                           3.5 - 5.0 g/dL                  Globulin                                          4.7 (H)                       2.3 - 3.5 g/dL                  A-G Ratio                                         0.8 (L)                       1.2 - 3.5                  -TROPONIN I       Result                                            Value                         Ref Range                       Troponin-I, Qt.                                   <0.02 (L)                     0.02 - 0.05 NG/ML          -D DIMER       Result                                            Value                         Ref Range                       D DIMER                                           0.48                          <0.55 ug/ml(FEU)           -LIPASE       Result                                            Value                         Ref Range                       Lipase                                            192                           73 - 393 U/L               -EKG, 12 LEAD, INITIAL       Result                                            Value Ref Range                       Ventricular Rate                                  82                            BPM                             Atrial Rate                                       82                            BPM                             P-R Interval                                      140                           ms                              QRS Duration                                      68                            ms                              Q-T Interval                                      366                           ms                              QTC Calculation ([employer]zet)                           427                           ms                              Calculated P Axis                                 68                            degrees                         Calculated R Axis                                 48                            degrees                         Calculated T Axis                                 45                            degrees                         Diagnosis                                                                                                   Normal sinus rhythm   Normal ECG   When compared with ECG of 06-MAR-2017 00:33,   Vent. rate has decreased BY  59 BPM   ST no longer depressed in Inferior leads   ST no longer depressed in Anterior leads   T wave inversion no longer evident in Inferior leads   T wave amplitude has increased in Lateral leads    )  Tests in the radiology section of CPT®: ordered and reviewed (Xr Chest Pa Lat    Result Date: 9/13/2017  2 view: 09/13/2017 History: Shortness of breath. Comparison: 03/07/2017 Findings: 2 views of the chest were obtained. The cardiac and mediastinal silhouette are normal in size and configuration. The lungs and pleural spaces are clear. The pulmonary vascularity is within normal limits. Impression: Unremarkable portable chest radiograph.      [employer]    Result Date: 9/14/2017  RIGHT UPPER QUADRANT ULTRASOUND. HISTORY: Right upper quadrant abdominal pain. COMPARISON: None FINDINGS: The ultrasonographic Baeza's sign is reported as  negative. Gallbladder sludge but no gallstones. The gallbladder wall is contracted. The common bile duct is not dilated, 4 mm. Intrahepatic biliary tree is not dilated. Included portion of the pancreas and right kidney are unremarkable. There is diffuse fatty change of the liver. IMPRESSION: Negative for gallstones or evidence of biliary tree obstruction. The gallbladder is contracted and contains sludge. Diffuse fatty change of the liver.     )      ED Course       Procedures

## 2017-09-14 NOTE — DISCHARGE INSTRUCTIONS
Low-Fat Diet for Gallbladder Disease: Care Instructions  Your Care Instructions  When you eat, the gallbladder releases bile, which helps you digest the fat in food. If you have an inflamed gallbladder, this may cause pain. A low-fat diet may give your gallbladder a rest so you can start to heal. Your doctor and dietitian can help you make an eating plan that does not irritate your digestive system. Always talk with your doctor or dietitian before you make changes in your diet. Follow-up care is a key part of your treatment and safety. Be sure to make and go to all appointments, and call your doctor if you are having problems. It's also a good idea to know your test results and keep a list of the medicines you take. How can you care for yourself at home? · Eat many small meals and snacks each day instead of three large meals. · Choose lean meats. ¨ Eat no more than 5 to 6½ ounces of meat a day. ¨ Cut off all fat you can see. ¨ Eat chicken and turkey without the skin. ¨ Many types of fish, such as salmon, lake trout, tuna, and herring, provide healthy omega-3 fat. But, avoid fish canned in oil, such as sardines in olive oil. ¨ Bake, broil, or grill meats, poultry, or fish instead of frying them in butter or fat. · Drink or eat nonfat or low-fat milk, yogurt, cheese, or other milk products each day. ¨ Read the labels on cheeses, and choose those with less than 5 grams of fat an ounce. ¨ Try fat-free sour cream, cream cheese, or yogurt. ¨ Avoid cream soups and cream sauces on pasta. ¨ Eat low-fat ice cream, frozen yogurt, or sorbet. Avoid regular ice cream.  · Eat whole-grain cereals, breads, crackers, rice, or pasta. Avoid high-fat foods such as croissants, scones, biscuits, waffles, doughnuts, muffins, granola, and high-fat breads. · Flavor your foods with herbs and spices (such as basil, tarragon, or mint), fat-free sauces, or lemon juice instead of butter.  You can also use butter substitutes, fat-free mayonnaise, or fat-free dressing. · Try applesauce, prune puree, or mashed bananas to replace some or all of the fat when you bake. · Limit fats and oils, such as butter, margarine, mayonnaise, and salad dressing, to no more than 1 tablespoon a meal.  · Avoid high-fat foods, such as:  ¨ Chocolate, whole milk, ice cream, and processed cheese. ¨ Fried or buttered foods. ¨ Sausage, salami, and howard. ¨ Cinnamon rolls, cakes, pies, cookies, and other pastries. ¨ Prepared snack foods, such as potato chips, nut and granola bars, and mixed nuts. ¨ Coconut and avocado. · Learn how to read food labels for serving sizes and ingredients. Fast-food and convenience-food meals often have lots of fat. Where can you learn more? Go to http://yasmin-carly.info/. Enter F876 in the search box to learn more about \"Low-Fat Diet for Gallbladder Disease: Care Instructions. \"  Current as of: July 26, 2016  Content Version: 11.3  © 2532-1060 [employer]. Care instructions adapted under license by [employer] (which disclaims liability or warranty for this information). If you have questions about a medical condition or this instruction, always ask your healthcare professional. [place] [address] any warranty or liability for your use of this information.

## 2017-09-14 NOTE — ED NOTES
Pt discharged instructions given pt v\u to instructions. Pt ambulatory on discharge. Pt in no acute distress at discharge. A  was used during the pts entire stay.

## 2017-10-30 ENCOUNTER — HOSPITAL ENCOUNTER (EMERGENCY)
Age: 47
Discharge: HOME | End: 2017-10-30
Attending: EMERGENCY MEDICINE
Payer: SELF-PAY

## 2017-10-30 VITALS
OXYGEN SATURATION: 100 % | RESPIRATION RATE: 18 BRPM | TEMPERATURE: 97.5 F | WEIGHT: 145 LBS | DIASTOLIC BLOOD PRESSURE: 101 MMHG | BODY MASS INDEX: 26.68 KG/M2 | HEART RATE: 106 BPM | HEIGHT: 62 IN | SYSTOLIC BLOOD PRESSURE: 141 MMHG

## 2017-10-30 DIAGNOSIS — L03.032 PARONYCHIA OF GREAT TOE, LEFT: Primary | ICD-10-CM

## 2017-10-30 PROCEDURE — 99283 EMERGENCY DEPT VISIT LOW MDM: CPT | Performed by: NURSE PRACTITIONER

## 2017-10-30 PROCEDURE — 75810000283 HC INJECTION NERVE BLOCK: Performed by: NURSE PRACTITIONER

## 2017-10-30 PROCEDURE — 74011000250 HC RX REV CODE- 250: Performed by: NURSE PRACTITIONER

## 2017-10-30 PROCEDURE — 74011250637 HC RX REV CODE- 250/637: Performed by: NURSE PRACTITIONER

## 2017-10-30 PROCEDURE — 75810000289 HC I&D ABSCESS SIMP/COMP/MULT: Performed by: NURSE PRACTITIONER

## 2017-10-30 RX ORDER — TRAMADOL HYDROCHLORIDE 50 MG/1
50 TABLET ORAL
Qty: 24 TAB | Refills: 0 | Status: SHIPPED | OUTPATIENT
Start: 2017-10-30 | End: 2018-04-24

## 2017-10-30 RX ORDER — CLINDAMYCIN HYDROCHLORIDE 150 MG/1
300 CAPSULE ORAL 4 TIMES DAILY
Qty: 56 CAP | Refills: 0 | Status: SHIPPED | OUTPATIENT
Start: 2017-10-30 | End: 2017-11-04

## 2017-10-30 RX ORDER — HYDROCODONE BITARTRATE AND ACETAMINOPHEN 5; 325 MG/1; MG/1
1 TABLET ORAL ONCE
Status: COMPLETED | OUTPATIENT
Start: 2017-10-30 | End: 2017-10-30

## 2017-10-30 RX ADMIN — HYDROCODONE BITARTRATE AND ACETAMINOPHEN 1 TABLET: 5; 325 TABLET ORAL at 14:27

## 2017-10-30 RX ADMIN — BENZOCAINE: 220 SPRAY, METERED PERIODONTAL at 13:37

## 2017-10-30 NOTE — LETTER
[address] [facility] EMERGENCY DEPT [place] [address] 
[phone] Work/School Note Date: 10/30/2017 To Whom It May concern: 
 
Jenny Nowak was seen and treated today in the emergency room by the following provider(s): 
Attending Provider: Mukesh Dooley MD 
Nurse Practitioner: Shaista Artis NP. Jenny Nowak may return to work on Wednesday. Sincerely, Shaista Artis NP

## 2017-10-30 NOTE — ED NOTES
I have reviewed discharge instructions with the patient. The patient verbalized understanding. Patient left ED via Discharge Method: ambulatory to waiting room by self. Opportunity for questions and clarification provided. Patient given 2 scripts and work note.  No e-sign

## 2017-10-30 NOTE — ED TRIAGE NOTES
Pt arrived ambulatory with c/o L great toe pain and swelling that started last night. Pt states she was providing her own nail care and injured the area.

## 2017-10-30 NOTE — DISCHARGE INSTRUCTIONS
Paroniquia: Instrucciones de cuidado - [ Paronychia: Care Instructions ]  Instrucciones de cuidado  La paroniquia es charis infección de la piel alrededor de charis uña de un dedo de la mano o del pie. Sucede cuando entran microbios a través de un joel en la piel. Jeremías vez el médico haya hecho charis pequeña incisión en la zhen infectada para sacar el pus. La mayoría de casos de paroniquia mejoran en pocos días. Sasha vigile shivani síntomas y [id number] [employer] [place] los consejos de lee médico. Aunque es poco común, un fili leve puede volverse algo más bobbi e infectar todo lee dedo. También es posible que charis infección regrese. La atención de seguimiento es charis parte clave de lee tratamiento y seguridad. Asegúrese de hacer y acudir a todas las citas, y llame a lee médico si está teniendo problemas. También es charis buena idea saber los resultados de los exámenes y mantener charis lista de los medicamentos que eulalia. ¿Cómo puede cuidarse en el hogar? · Si lee médico le dijo cómo cuidarse la uña infectada, siga las instrucciones de lee médico. Si no le nestor instrucciones, siga estos consejos generales:  ¨ Lávese la zhen con agua limpia 2 veces al día. No use peróxido de hidrógeno (agua [place]) ni alcohol, los cuales pueden retrasar la sanación. ¨ Puede cubrir la zhen con charis capa delgada de vaselina y charis venda no adherente. ¨ Aplíquese más vaselina y [place] la venda según sea necesario. · Si lee médico le recetó antibióticos, tómelos según las indicaciones. No deje de tomarlos por el hecho de sentirse mejor. Debe lucero todos los antibióticos hasta terminarlos. · [place] un analgésico (medicamento para el dolor) de venta ozzy, negrito acetaminofén (Tylenol), ibuprofeno (Advil, Motrin) o naproxeno (Aleve). Kait y siga todas las instrucciones de la [place]. · No tome dos o más analgésicos al mismo tiempo a menos que el médico se lo haya indicado. Muchos analgésicos contienen acetaminofén, es decir, Tylenol.  El exceso de acetaminofén (Tylenol) puede ser Misty Lucks. · Eleve el dedo por encima del nivel de lee corazón. [place] ayuda a reducir la hinchazón y el dolor. · Aplíquese calor. Colóquese charis bolsa de [place], charis almohadilla térmica ajustada a baja temperatura o un paño tibio sobre el dedo. No se vaya a dormir con charis almohadilla térmica sobre la piel. · Remoje la zhen en agua tibia dos veces al día por 15 minutos cada vez. Después de remojarla, séquese jen la zhen y aplíquese charis capa delgada de vaselina. Póngase charis venda nueva. ¿Cuándo debe pedir ayuda? Llame a lee médico ahora mismo o busque atención médica inmediata si:  ? · Tiene señales de charis infección nueva o que LIVIER, tales negrito:  ¨ Aumento del dolor, la hinchazón, la temperatura o el enrojecimiento. ¨ Vetas rojizas que salen de la zhen de piel infectada. ¨ Pus que sale de la zhen. Jeromy Martinez. ?Preste especial atención a los cambios en lee jelly y asegúrese de comunicarse con lee médico si:  ? · No mejora negrito se esperaba. ¿Dónde puede encontrar más información en inglés? Debbie Galindo a http://yasmin-carly.info/. Escriba C435 en la búsqueda para aprender más acerca de \"Paroniquia: Instrucciones de cuidado - [ Paronychia: Care Instructions ]. \"  Revisado: 13 octubre, 2016  Versión del contenido: 11.4  © 4460-7259 [employer]. Las instrucciones de cuidado fueron adaptadas bajo licencia por [employer] (which disclaims liability or warranty for this information). Si usted tiene [place] afección médica o sobre estas instrucciones, siempre pregunte a lee profesional de jelly. [facility] niega toda garantía o responsabilidad por lee uso de esta información.

## 2017-10-30 NOTE — ED PROVIDER NOTES
HPI Comments: 50 y/o w left great toe pain. Notes pain last two days. Some swelling and drainage noted. Good mvt.  lmp oct ten, regular and on time. No other complaints    Patient is a 49 y.o. female presenting with toe pain. The history is provided by the patient. No  was used. Toe Pain    This is a new problem. The current episode started 2 days ago. The problem has been gradually worsening. The pain is present in the left toes. The quality of the pain is described as aching and pounding. The pain is moderate. Associated symptoms include stiffness. Pertinent negatives include no numbness, full range of motion, no tingling, no itching, no back pain and no neck pain. There has been no history of extremity trauma. Past Medical History:   Diagnosis Date    HTN (hypertension) 4/12/2013    Hypertension     no meds    Medical non-compliance 9/8/2017    Missed ab     6wks    Other ill-defined conditions     kidney stones    Sepsis ([facility] [address] 3/6/2017       No past surgical history on file. No family history on file. Social History     Social History    Marital status: SINGLE     Spouse name: N/A    Number of children: N/A    Years of education: N/A     Occupational History    Not on file. Social History Main Topics    Smoking status: Former Smoker    Smokeless tobacco: Never Used    Alcohol use No    Drug use: No    Sexual activity: No     Other Topics Concern    Not on file     Social History Narrative         ALLERGIES: Amoxicillin and Morphine    Review of Systems   Constitutional: Negative for chills and fever. HENT: Negative for facial swelling and mouth sores. Eyes: Negative for discharge and redness. Respiratory: Negative for cough and shortness of breath. Cardiovascular: Negative for chest pain and palpitations. Gastrointestinal: Negative for nausea and vomiting. Endocrine: Negative for cold intolerance and heat intolerance.    Genitourinary: Negative for difficulty urinating and dysuria. Musculoskeletal: Positive for gait problem and stiffness. Negative for back pain and neck pain. Skin: Positive for wound. Negative for itching and pallor. Neurological: Negative for dizziness, tingling, weakness and numbness. Psychiatric/Behavioral: Negative for decreased concentration and dysphoric mood. Vitals:    10/30/17 1204   BP: (!) 140/102   Pulse: (!) 115   Resp: 19   Temp: 97.5 °F (36.4 °C)   SpO2: 100%   Weight: 65.8 kg (145 lb)   Height: 5' 2\" (1.575 m)            Physical Exam   Constitutional: She is oriented to person, place, and time. She appears well-developed and well-nourished. No distress. HENT:   Head: Normocephalic and atraumatic. Right Ear: External ear normal.   Left Ear: External ear normal.   Nose: Nose normal.   Eyes: Conjunctivae and EOM are normal. Pupils are equal, round, and reactive to light. Neck: Normal range of motion. Neck supple. Cardiovascular: Normal rate, regular rhythm and normal heart sounds. Pulmonary/Chest: Effort normal and breath sounds normal. No respiratory distress. She has no wheezes. Abdominal: Soft. Bowel sounds are normal. She exhibits no distension. There is no tenderness. Musculoskeletal: Normal range of motion. She exhibits edema and tenderness. Feet:    Neurological: She is alert and oriented to person, place, and time. No cranial nerve deficit. Coordination normal.   Skin: Skin is warm and dry. No rash noted. Psychiatric: She has a normal mood and affect. Her behavior is normal. Judgment and thought content normal.   Nursing note and vitals reviewed. MDM  Number of Diagnoses or Management Options  Diagnosis management comments: 50 y/o w left great toe pain. Notes pain last two days. Some swelling and drainage noted. Good mvt.  lmp oct ten, regular and on time.   No other complaints    Paronychia noted on exam.  Will open, provide pain control and abx.  1:57 PM topex used, pt wanted injection after trying topex. Digital block with 1 % plain lidocaine with 4 ml - pt allowed only injection to 3 places on base of toe. Would not allow 4th. Will go back shortly to open paronychia  2:19 PM  Paronychia opened with single stab wound w #11 blade. Bloody drainage back. Pt tolerated well. Will dc with pain meds and abx.     Risk of Complications, Morbidity, and/or Mortality  Presenting problems: minimal  Diagnostic procedures: low  Management options: minimal    Patient Progress  Patient progress: stable    ED Course       Procedures

## 2017-11-04 ENCOUNTER — HOSPITAL ENCOUNTER (EMERGENCY)
Age: 47
Discharge: HOME | End: 2017-11-04
Attending: EMERGENCY MEDICINE
Payer: SELF-PAY

## 2017-11-04 VITALS
DIASTOLIC BLOOD PRESSURE: 92 MMHG | TEMPERATURE: 98.1 F | HEART RATE: 88 BPM | BODY MASS INDEX: 26.68 KG/M2 | HEIGHT: 62 IN | OXYGEN SATURATION: 99 % | RESPIRATION RATE: 20 BRPM | WEIGHT: 145 LBS | SYSTOLIC BLOOD PRESSURE: 162 MMHG

## 2017-11-04 DIAGNOSIS — L03.032 PARONYCHIA OF GREAT TOE OF LEFT FOOT: Primary | ICD-10-CM

## 2017-11-04 PROCEDURE — 99282 EMERGENCY DEPT VISIT SF MDM: CPT | Performed by: PHYSICIAN ASSISTANT

## 2017-11-04 RX ORDER — CLINDAMYCIN HYDROCHLORIDE 150 MG/1
300 CAPSULE ORAL 4 TIMES DAILY
Qty: 56 CAP | Refills: 0 | Status: SHIPPED | OUTPATIENT
Start: 2017-11-04 | End: 2017-11-11

## 2017-11-04 RX ORDER — CLINDAMYCIN HYDROCHLORIDE 150 MG/1
300 CAPSULE ORAL 4 TIMES DAILY
Qty: 56 CAP | Refills: 0 | Status: SHIPPED | OUTPATIENT
Start: 2017-11-04 | End: 2017-11-04

## 2017-11-04 NOTE — DISCHARGE INSTRUCTIONS
Paroniquia: Instrucciones de cuidado - [ Paronychia: Care Instructions ]  Instrucciones de cuidado  La paroniquia es charis infección de la piel alrededor de charis uña de un dedo de la mano o del pie. Sucede cuando entran microbios a través de un joel en la piel. Jeremías vez el médico haya hecho charis pequeña incisión en la zhen infectada para sacar el pus. La mayoría de casos de paroniquia mejoran en pocos días. Sasha vigile shivani síntomas y [id number] [employer] [place] los consejos de lee médico. Aunque es poco común, un fili leve puede volverse algo más bobbi e infectar todo lee dedo. También es posible que charis infección regrese. La atención de seguimiento es charis parte clave de lee tratamiento y seguridad. Asegúrese de hacer y acudir a todas las citas, y llame a lee médico si está teniendo problemas. También es charis buena idea saber los resultados de los exámenes y mantener charis lista de los medicamentos que eulalia. ¿Cómo puede cuidarse en el hogar? · Si lee médico le dijo cómo cuidarse la uña infectada, siga las instrucciones de lee médico. Si no le nestor instrucciones, siga estos consejos generales:  ¨ Lávese la zhen con agua limpia 2 veces al día. No use peróxido de hidrógeno (agua [place]) ni alcohol, los cuales pueden retrasar la sanación. ¨ Puede cubrir la zhen con charis capa delgada de vaselina y charis venda no adherente. ¨ Aplíquese más vaselina y [place] la venda según sea necesario. · Si lee médico le recetó antibióticos, tómelos según las indicaciones. No deje de tomarlos por el hecho de sentirse mejor. Debe lucero todos los antibióticos hasta terminarlos. · [place] un analgésico (medicamento para el dolor) de venta ozzy, negrito acetaminofén (Tylenol), ibuprofeno (Advil, Motrin) o naproxeno (Aleve). Kait y siga todas las instrucciones de la [place]. · No tome dos o más analgésicos al mismo tiempo a menos que el médico se lo haya indicado. Muchos analgésicos contienen acetaminofén, es decir, Tylenol.  El exceso de acetaminofén (Tylenol) puede ser Rubi Ruiz. · Eleve el dedo por encima del nivel de lee corazón. [place] ayuda a reducir la hinchazón y el dolor. · Aplíquese calor. Colóquese charis bolsa de [place], charis almohadilla térmica ajustada a baja temperatura o un paño tibio sobre el dedo. No se vaya a dormir con charis almohadilla térmica sobre la piel. · Remoje la zhen en agua tibia dos veces al día por 15 minutos cada vez. Después de remojarla, séquese jen la zhen y aplíquese charis capa delgada de vaselina. Póngase charis venda nueva. ¿Cuándo debe pedir ayuda? Llame a lee médico ahora mismo o busque atención médica inmediata si:  ? · Tiene señales de charis infección nueva o que LIVIER, tales negrito:  ¨ Aumento del dolor, la hinchazón, la temperatura o el enrojecimiento. ¨ Vetas rojizas que salen de la zhen de piel infectada. ¨ Pus que sale de la zhen. Matthieu Me. ?Preste especial atención a los cambios en lee jelly y asegúrese de comunicarse con lee médico si:  ? · No mejora negrito se esperaba. ¿Dónde puede encontrar más información en inglés? Tiffany Guerrero a http://yasmin-carly.info/. Escriba C435 en la búsqueda para aprender más acerca de \"Paroniquia: Instrucciones de cuidado - [ Paronychia: Care Instructions ]. \"  Revisado: 13 octubre, 2016  Versión del contenido: 11.4  © 8010-7522 [employer]wise, Incorporated. Las instrucciones de cuidado fueron adaptadas bajo licencia por [employer] (which disclaims liability or warranty for this information). Si usted tiene [place] afección médica o sobre estas instrucciones, siempre pregunte a lee profesional de jelly. [facility] niega toda garantía o responsabilidad por lee uso de esta información.

## 2017-11-04 NOTE — ED PROVIDER NOTES
HPI Comments: Patient is here with right great toe pain that has been going on now for a little over a week. He was seen on Monday for the same but did not fill her prescription. She was diagnosed with an ingrown toenail and states the antibiotic was too expensive at [facility]. She has not had a fever, swelling to the foot, nausea, vomiting, pain in the calf or leg or swelling to those areas, dizziness, dyspnea on exertion or diaphoresis. No chest pain or shortness of breath. She was ambulatory to the room without difficulty and well-hydrated. Patient is a 49 y.o. female presenting with toe pain. The history is provided by the patient. Toe Pain    This is a recurrent problem. The current episode started more than 1 week ago. The problem occurs constantly. The problem has not changed since onset. The pain is present in the right toes. The quality of the pain is described as aching. The pain is at a severity of 6/10. The pain is moderate. Pertinent negatives include no numbness, full range of motion, no stiffness, no tingling, no itching, no back pain and no neck pain. The symptoms are aggravated by palpation. She has tried nothing for the symptoms. There has been no history of extremity trauma. Past Medical History:   Diagnosis Date    HTN (hypertension) 4/12/2013    Hypertension     no meds    Medical non-compliance 9/8/2017    Missed ab     6wks    Other ill-defined conditions(799.89)     kidney stones    Sepsis ([facility] [address] 3/6/2017       No past surgical history on file. No family history on file. Social History     Social History    Marital status: SINGLE     Spouse name: N/A    Number of children: N/A    Years of education: N/A     Occupational History    Not on file.      Social History Main Topics    Smoking status: Former Smoker    Smokeless tobacco: Never Used    Alcohol use No    Drug use: No    Sexual activity: No     Other Topics Concern    Not on file     Social History Narrative         ALLERGIES: Amoxicillin and Morphine    Review of Systems   Constitutional: Negative. HENT: Negative. Eyes: Negative. Respiratory: Negative. Cardiovascular: Negative. Gastrointestinal: Negative. Genitourinary: Negative. Musculoskeletal: Negative. Negative for back pain, neck pain and stiffness. Left great toe pain   Skin: Negative. Negative for itching. Neurological: Negative. Negative for tingling and numbness. Psychiatric/Behavioral: Negative. All other systems reviewed and are negative. Vitals:    11/04/17 0958   BP: (!) 162/92   Pulse: 88   Resp: 20   Temp: 98.1 °F (36.7 °C)   SpO2: 99%   Weight: 65.8 kg (145 lb)   Height: 5' 2\" (1.575 m)            Physical Exam   Constitutional: She is oriented to person, place, and time. She appears well-developed and well-nourished. HENT:   Head: Normocephalic and atraumatic. Right Ear: External ear normal.   Left Ear: External ear normal.   Nose: Nose normal.   Mouth/Throat: Oropharynx is clear and moist.   Eyes: Conjunctivae and EOM are normal. Pupils are equal, round, and reactive to light. Neck: Normal range of motion. Neck supple. Cardiovascular: Normal rate, regular rhythm, normal heart sounds and intact distal pulses. Pulmonary/Chest: Effort normal and breath sounds normal.   Abdominal: Soft. Bowel sounds are normal.   Musculoskeletal: Normal range of motion. Feet:    Neurological: She is alert and oriented to person, place, and time. She has normal reflexes. Skin: Skin is warm and dry. Psychiatric: She has a normal mood and affect. Her behavior is normal. Judgment and thought content normal.   Nursing note and vitals reviewed.        MDM  Number of Diagnoses or Management Options  Paronychia of great toe of left foot: minor  Risk of Complications, Morbidity, and/or Mortality  Presenting problems: moderate  Diagnostic procedures: moderate  Management options: moderate    Patient Progress  Patient progress: stable    ED Course       Procedures      The patient was observed in the ED. Patient was told to wash the area twice daily with soap and water, blot dry, apply Neosporin and a dressing. She should fill the antibiotics and can get them at [facility] where there are $4. Patient expressed understanding and will follow-up with her primary care physician and fill the medication. There is nothing to open or drain today. I discussed the results of all labs, procedures, radiographs, and treatments with the patient and available family. Treatment plan is agreed upon and the patient is ready for discharge. All voiced understanding of the discharge plan and medication instructions or changes as appropriate. Questions about treatment in the ED were answered. All were encouraged to return should symptoms worsen or new problems develop.

## 2017-11-04 NOTE — ED TRIAGE NOTES
Pt states she has leg pain and pain to the great toe on the right foot. Pt was seen here last week for the same complaint. Pt states she cannot afford her medication.

## 2017-12-29 ENCOUNTER — HOSPITAL ENCOUNTER (EMERGENCY)
Age: 47
Discharge: HOME | End: 2017-12-29
Attending: EMERGENCY MEDICINE
Payer: SELF-PAY

## 2017-12-29 VITALS
TEMPERATURE: 98 F | HEART RATE: 94 BPM | HEIGHT: 62 IN | WEIGHT: 145 LBS | BODY MASS INDEX: 26.68 KG/M2 | SYSTOLIC BLOOD PRESSURE: 149 MMHG | RESPIRATION RATE: 18 BRPM | OXYGEN SATURATION: 98 % | DIASTOLIC BLOOD PRESSURE: 80 MMHG

## 2017-12-29 DIAGNOSIS — M54.50 ACUTE MIDLINE LOW BACK PAIN WITHOUT SCIATICA: Primary | ICD-10-CM

## 2017-12-29 PROCEDURE — 99283 EMERGENCY DEPT VISIT LOW MDM: CPT | Performed by: EMERGENCY MEDICINE

## 2017-12-29 PROCEDURE — 74011250637 HC RX REV CODE- 250/637: Performed by: EMERGENCY MEDICINE

## 2017-12-29 RX ORDER — METHOCARBAMOL 750 MG/1
750 TABLET, FILM COATED ORAL 4 TIMES DAILY
Qty: 30 TAB | Refills: 0 | Status: SHIPPED | OUTPATIENT
Start: 2017-12-29 | End: 2018-01-06

## 2017-12-29 RX ORDER — METHOCARBAMOL 500 MG/1
750 TABLET, FILM COATED ORAL
Status: COMPLETED | OUTPATIENT
Start: 2017-12-29 | End: 2017-12-29

## 2017-12-29 RX ORDER — TRAMADOL HYDROCHLORIDE 50 MG/1
50 TABLET ORAL
Status: COMPLETED | OUTPATIENT
Start: 2017-12-29 | End: 2017-12-29

## 2017-12-29 RX ORDER — TRAMADOL HYDROCHLORIDE 50 MG/1
50 TABLET ORAL
Qty: 20 TAB | Refills: 0 | Status: SHIPPED | OUTPATIENT
Start: 2017-12-29 | End: 2018-04-24

## 2017-12-29 RX ADMIN — TRAMADOL HYDROCHLORIDE 50 MG: 50 TABLET, FILM COATED ORAL at 20:44

## 2017-12-29 RX ADMIN — METHOCARBAMOL 750 MG: 500 TABLET ORAL at 20:44

## 2017-12-29 NOTE — ED TRIAGE NOTES
Pt c/o lower back pain radiating into hips and legs. Pt also c/o pain in bilateral shoulders and neck. Pt states pain started last night after lifting something heavy. Pt denies any numbness or tingling. Pt ambulatory to triage without any difficulty.

## 2017-12-29 NOTE — LETTER
[facility] [place] [address] [address] [phone] Work/School Note Date: 12/29/2017 To Whom It May concern: 
 
Vanessa Shearer was seen and treated today in the emergency room by the following provider(s): 
Attending Provider: Anton Pickens MD.   
 
Vanessa Shearer may return to work on 12/31/17. Sincerely, 
 
 
 
 
Yohannes Almaguer

## 2017-12-30 NOTE — ED NOTES
I have reviewed discharge instructions with the patient. The patient verbalized understanding. Patient left ED via Discharge Method: ambulatory to Home with self transport from family. The patient is ambulatory upon exit and appears in no acute distress. The patient has discharge instructions and prescriptions x2 in hand. The patient does not have any questions at this time and is being driven home by family. Opportunity for questions and clarification provided. Patient given 2 scripts. To continue your aftercare when you leave the hospital, you may receive an automated call from our care team to check in on how you are doing. This is a free service and part of our promise to provide the best care and service to meet your aftercare needs.  If you have questions, or wish to unsubscribe from this service please call [phone]. Thank you for Choosing our [facility] Emergency Department.

## 2017-12-30 NOTE — DISCHARGE INSTRUCTIONS
Dolor de espalda: Instrucciones de cuidado - [ Back Pain: Care Instructions ]  Instrucciones de cuidado    El dolor de espalda tiene muchas causas posibles. Con frecuencia, está relacionado con problemas en los músculos y ligamentos de la espalda. También podría estar relacionado con problemas de los nervios, discos o huesos de la espalda. Moverse, levantar algo, ponerse de pie, sentarse o dormir de [place] incómoda pueden forzar la espalda. Algunas veces no se da cuenta de que tiene charis lesión [employer] tarde. La artritis es otra causa común del dolor de espalda. Aunque aliyah vez duela mucho, el dolor de espalda por lo general mejora por sí mismo en varias semanas. [address] personas se recuperan en 12 semanas o menos. Hacer un buen tratamiento en el hogar y tener cuidado de no forzar la espalda puede ayudar a que se sienta mejor más pronto. La atención de seguimiento es charis parte clave de lee tratamiento y seguridad. Asegúrese de hacer y acudir a todas las citas, y llame a lee médico si está teniendo problemas. También es charis buena idea saber los resultados de los exámenes y mantener charis lista de los medicamentos que eulalia. ¿Cómo puede cuidarse en el hogar? · Siéntese o acuéstese en las posiciones más cómodas y que reduzcan el dolor. Trate charis de estas posiciones al acostarse:  ¨ Acuéstese boca arriba con las rodillas dobladas y apoyadas sobre almohadones grandes. ¨ Acuéstese en el piso con las piernas sobre el asiento de un sofá o charis silla. ¨ Acuéstese de lado con las rodillas y caderas dobladas y [place] entre las piernas. ¨ Acuéstese boca abajo siempre que esta posición no empeore el dolor. · No se siente en la cama y evite los sofás blandos y las posiciones torcidas. El reposo en cama puede aliviar el dolor al principio, evans retrasa la sanación. Evite reposar en la cama después del primer día de dolor de espalda. · Cambie de posición cada 30 minutos.  Si debe sentarse por [employer], párese y descanse de estar sentado. Levántese y camine, o acuéstese en charis posición cómoda. · Pruebe usar charis almohadilla térmica a temperatura baja o mediana alessandra 15 a 20 minutos cada 2 ó 3 horas. Pruebe charis ducha tibia en vez de charis sesión con la almohadilla térmica. · También puede probar charis compresa de hielo alessandra 10 a 15 minutos cada 2 a 3 horas. Póngase un paño lewis entre la compresa de hielo y la piel. · [employer] analgésicos (medicamentos para el dolor) exactamente según las indicaciones. ¨ Si el médico le recetó un analgésico, tómelo según las indicaciones. ¨ Si no está tomando un analgésico recetado, pregúntele a lee médico si puede lucero lore de [address] · Lily caminatas cortas varias veces al día. Usted puede comenzar con 5 a 10 minutos, 3 ó 4 veces al día, y aumentar progresivamente hasta lograr caminatas más largas. Camine en superficies nevin y evite colinas y escaleras hasta que la espalda esté mejor. · Regrese al Cyrena Deter y otras actividades lo más pronto posible. El descanso continuo sin actividad por lo general no es june para la espalda. · Para prevenir el dolor de espalda en el futuro, lily ejercicios para estirar y fortalecer la espalda y el abdomen. Aprenda a Time [place], técnicas seguras para levantar peso y la mecánica corporal apropiada. ¿Cuándo debe pedir ayuda? Llame a lee médico ahora mismo o busque atención médica inmediata si:  ? · Tiene un nuevo entumecimiento en [place]. ? · Tiene un nuevo debilitamiento en [facility]. ([place] puede hacer que sea difícil ponerse de pie). ? · Pierde el control de la vejiga o los intestinos. ?Preste especial atención a los cambios en lee jelly y asegúrese de comunicarse con lee médico si:  ? · El dolor LIVIER. ? · No está mejorando después de 2 semanas. ¿Dónde puede encontrar más información en inglés? Clemencia oYunger a http://yasmin-carly.info/.   Escriba A005 en la búsqueda para aprender más acerca de \"Dolor de espalda: Instrucciones de cuidado - [ Back Pain: Care Instructions ]. \"  Revisado: 21 marzo, 2017  Versión del contenido: 11.4  © 5092-3628 Healthwise, Incorporated. Las instrucciones de cuidado fueron adaptadas bajo licencia por Good Help Connections (which disclaims liability or warranty for this information). Si usted tiene [place] afección médica o sobre estas instrucciones, siempre pregunte a lee profesional de jelly. Healthwise, Incorporated niega toda garantía o responsabilidad por lee uso de esta información.

## 2018-03-07 ENCOUNTER — HOSPITAL ENCOUNTER (EMERGENCY)
Age: 48
Discharge: HOME | End: 2018-03-07
Attending: EMERGENCY MEDICINE
Payer: SELF-PAY

## 2018-03-07 VITALS
HEIGHT: 62 IN | RESPIRATION RATE: 18 BRPM | HEART RATE: 94 BPM | TEMPERATURE: 99.1 F | BODY MASS INDEX: 26.68 KG/M2 | OXYGEN SATURATION: 98 % | WEIGHT: 145 LBS | DIASTOLIC BLOOD PRESSURE: 96 MMHG | SYSTOLIC BLOOD PRESSURE: 146 MMHG

## 2018-03-07 DIAGNOSIS — G44.89 OTHER HEADACHE SYNDROME: ICD-10-CM

## 2018-03-07 DIAGNOSIS — J01.90 ACUTE SINUSITIS, RECURRENCE NOT SPECIFIED, UNSPECIFIED LOCATION: Primary | ICD-10-CM

## 2018-03-07 PROCEDURE — 99283 EMERGENCY DEPT VISIT LOW MDM: CPT | Performed by: EMERGENCY MEDICINE

## 2018-03-07 RX ORDER — SULFAMETHOXAZOLE AND TRIMETHOPRIM 800; 160 MG/1; MG/1
1 TABLET ORAL 2 TIMES DAILY
Qty: 20 TAB | Refills: 0 | Status: SHIPPED | OUTPATIENT
Start: 2018-03-07 | End: 2018-03-17

## 2018-03-07 NOTE — ED PROVIDER NOTES
Patient is a 49 y.o. female presenting with sinus problems. The history is provided by the patient. Sinus Infection    This is a new problem. The current episode started more than 1 week ago. The problem has not changed since onset. There has been no fever. The pain is moderate. The pain has been constant since onset. Associated symptoms include congestion, sinus pressure, rhinorrhea and headaches. Pertinent negatives include no ear pain, no sore throat, no cough, no neck pain and no neck pain. She has tried nothing for the symptoms. The treatment provided no relief. Past Medical History:   Diagnosis Date    HTN (hypertension) 4/12/2013    Hypertension     no meds    Medical non-compliance 9/8/2017    Missed ab     6wks    Other ill-defined conditions(799.89)     kidney stones    Sepsis ([facility] [address] 3/6/2017       History reviewed. No pertinent surgical history. History reviewed. No pertinent family history. Social History     Social History    Marital status: SINGLE     Spouse name: N/A    Number of children: N/A    Years of education: N/A     Occupational History    Not on file. Social History Main Topics    Smoking status: Former Smoker    Smokeless tobacco: Never Used    Alcohol use No    Drug use: No    Sexual activity: No     Other Topics Concern    Not on file     Social History Narrative         ALLERGIES: Amoxicillin and Morphine    Review of Systems   Constitutional: Negative for fever. HENT: Positive for congestion, rhinorrhea and sinus pressure. Negative for ear pain and sore throat. Eyes: Negative for photophobia. Respiratory: Negative for cough. Gastrointestinal: Negative for nausea and vomiting. Musculoskeletal: Negative for back pain and neck pain. Neurological: Positive for headaches. Negative for weakness and numbness.        Vitals:    03/07/18 1505   BP: (!) 154/96   Pulse: 94   Resp: 17   Temp: 99.1 °F (37.3 °C)   SpO2: 100%   Weight: 65.8 kg (145 lb) Height: 5' 2\" (1.575 m)            Physical Exam   Constitutional: She appears well-developed and well-nourished. HENT:   Nose: Right sinus exhibits maxillary sinus tenderness and frontal sinus tenderness. Left sinus exhibits maxillary sinus tenderness and frontal sinus tenderness. Mouth/Throat: Oropharynx is clear and moist. No oropharyngeal exudate. Neck: Full passive range of motion without pain. Neck supple. No spinous process tenderness and no muscular tenderness present. No Brudzinski's sign and no Kernig's sign noted. Cardiovascular: Normal rate, regular rhythm and normal heart sounds. Pulmonary/Chest: Effort normal and breath sounds normal.   Abdominal: Soft. She exhibits no distension. There is no tenderness. There is no rebound and no guarding. Lymphadenopathy:     She has no cervical adenopathy. Neurological: She is alert. No cranial nerve deficit or sensory deficit. She exhibits normal muscle tone. Coordination normal. GCS eye subscore is 4. GCS verbal subscore is 5. GCS motor subscore is 6. Nursing note and vitals reviewed. MDM  Number of Diagnoses or Management Options  Diagnosis management comments: Given duration of symptoms will treat with antibiotics. Seasonal allergies are possible as well as patient does report similar symptoms in the past.  She has had intermittent headaches similar to this occasionally with some visual disturbances well. Migraine headaches possible as well.   No thunderclap headache to suggest subarachnoid hemorrhage and neurologic exam is normal.        ED Course       Procedures

## 2018-03-07 NOTE — ED TRIAGE NOTES
PT c/o runny nose, sinus congestion, headache, and sneezing x 1 month. PT denies any further complaints.

## 2018-03-07 NOTE — DISCHARGE INSTRUCTIONS
Sinusitis: Instrucciones de cuidado - [ Sinusitis: Care Instructions ]  Instrucciones de cuidado    La sinusitis es charis infección del recubrimiento de las cavidades de los senos paranasales de la michelle. La sinusitis con frecuencia se presenta después de un resfriado. Causa dolor y presión en la michelle y la don. En la [employer], la sinusitis mejora iliana en 1 o 2 semanas. Sasha algunos síntomas leves pueden durar varias semanas. A veces se necesitan antibióticos. La atención de seguimiento es charis parte clave de lee tratamiento y seguridad. Asegúrese de hacer y acudir a todas las citas, y llame a lee médico si está teniendo problemas. También es charis buena idea saber los resultados de los exámenes y mantener charis lista de los medicamentos que eulalia. ¿Cómo puede cuidarse en el hogar? · [place] un analgésico (medicamento para el dolor) de venta ozzy, negrito acetaminofén (Tylenol), ibuprofeno (Advil, Motrin) o naproxeno (Aleve). Kait y siga todas las instrucciones de la [place]. · Si el médico le recetó antibióticos, tómelos negrito se los indicó. No deje de tomarlos por el hecho de sentirse mejor. Debe lucero todos los antibióticos hasta terminarlos. · Tenga cuidado cuando tome medicamentos de venta ozzy para el resfriado común o la gripe y Tylenol al [employer]. Muchos de estos medicamentos contienen acetaminofén, o sea, Tylenol. Kait las etiquetas para asegurarse de que no está tomando charis dosis mayor que la recomendada. El exceso de acetaminofén (Tylenol) puede ser dañino. · Respire aire caliente y húmedo de [place] con vapor, un baño caliente o un lavamanos lleno de [place]. Evite el aire frío y seco. Usar un humidificador en lee casa podría ayudar. Siga las indicaciones para limpiar el aparato. · Use lavados nasales con solución salina (agua salada) para ayudar a mantener las fosas nasales despejadas y eliminar la mucosidad y las bacterias.  Puede comprar gotas nasales de solución salina en un supermercado o Sudie Gasper. O puede prepararlas usted mismo en casa agregándole 1 cucharadita de sal y 1 cucharadita de bicarbonato de sodio a 2 tazas de agua destilada. Si las prepara usted mismo, llene charis marylou de goma con la solución, introduzca la punta en la fosa nasal y apriete con suavidad. Suénese la nariz. · Colóquese charis toalla caliente y húmeda o charis almohadilla tibia de gel en la don 3 o 4 veces al día por entre 5 y 8 minutos [address] [place]. · Pruebe con un descongestionante nasal en aerosol, negrito oximetazolina (Afrin). No lo use por más de 3 días seguidos. Usarlo por más de 3 días puede empeorar la congestión. ¿Cuándo debe pedir ayuda? Llame a lee médico ahora mismo o busque atención médica inmediata si:  ? · Tiene hinchazón o enrojecimiento nuevos o peores en la don o alrededor de los ojos. ? · Tiene fiebre nueva o más andrés. ?Preste especial atención a los cambios en lee jelly y asegúrese de comunicarse con lee médico si:  ? · Tiene dolor facial nuevo o que empeora. ? · El moco de la Flora [place] and Melissa se vuelve más espeso (negrito pus) o contiene lola nueva. ? · No mejora negrito se esperaba. ¿Dónde puede encontrar más información en inglés? Teressa Salazar a http://yasmin-carly.info/. Mireille Parry D183 en la búsqueda para aprender más acerca de \"Sinusitis: Instrucciones de cuidado - [ Sinusitis: Care Instructions ]. \"  Revisado: 12 crandall, 2017  Versión del contenido: 11.4  © 7018-8722 Healthwise, Incorporated. Las instrucciones de cuidado fueron adaptadas bajo licencia por Good Help Connections (which disclaims liability or warranty for this information). Si usted tiene [place] afección médica o sobre estas instrucciones, siempre pregunte a lee profesional de jelly. Healthwise, Incorporated niega toda garantía o responsabilidad por lee uso de esta información. Dolor de michelle:  Instrucciones de cuidado - [ Headache: Care Instructions ]  Instrucciones de Alexsandra Rigo Alexander 1951 [place] tienen muchas causas posibles. La mayoría de los daniel de michelle no son señal de un problema más bobbi y mejoran por sí solos. El tratamiento en el hogar podría ayudarlo a sentirse mejor con Twyla Jono. El médico lo delacruz revisado minuciosamente, evans puede desarrollar problemas más tarde. Si nota algún problema o síntomas, busque tratamiento médico inmediatamente. La atención de seguimiento es charis parte clave de lee tratamiento y seguridad. Asegúrese de hacer y acudir a todas las citas, y llame a lee médico si está teniendo problemas. También es charis buena idea saber los resultados de los exámenes y mantener charis lista de los medicamentos que eulalia. ¿Cómo puede cuidarse en el hogar? · No conduzca si ha tomado analgésicos (medicamentos para el dolor) recetados. · Descanse en un cuarto tranquilo y oscuro hasta que desaparezca el dolor de [place]. Cierre los ojos y trate de relajarse o dormirse. No fatmata la televisión ni guillermina. · Colóquese un paño frío y húmedo o [place] compresa fría sobre la zhen adolorida de 10 a 21 minutos cada vez. Póngase un paño lewis entre la compresa fría y la piel. · Utilice charis toalla húmeda tibia o charis almohadilla térmica ajustada a [place]aja temperatura para relajar los músculos tensos del julius y los hombros.  · Pídale a alguien que le lily masajes suaves en el julius y los hombros.  · [place] los analgésicos exactamente negrito le fueron indicados. ¨ Si el médico le recetó un analgésico, tómelo según las indicaciones. ¨ Si no está tomando un analgésico recetado, pregúntele a lee médico si puede lucero lore de [employer]. · Tenga cuidado de no lucero analgésicos con mayor frecuencia que la permitida en las indicaciones porque los daniel de michelle podrían empeorar o aparecer con mayor frecuencia charis vez que el medicamento pierda lee [place]. · Preste atención a los nuevos síntomas que aparecen con el dolor de [facility] york, debilidad o entumecimiento, cambios en la visión o confusión.  Podrían ser señales de un problema más grave. Para prevenir los daniel de michelle  · [employer] un diario de shivani daniel de michelle para que pueda averiguar qué los desencadena. Evitar los desencadenantes podría ayudar a prevenir los daniel de [place]. Anote cuándo empieza cada dolor de [place], cuánto dura y cómo es el dolor (palpitante, luis, punzante o sordo). Anote cualquier otro síntoma que haya tenido con el dolor de [place], [place] náuseas, destellos de kyara o TREMAYNE, o sensibilidad a la kyara brillante o a los ruidos chino. Anote si el dolor de michelle ocurrió cerca de lee menstruación. Enumere todos los factores que pudieran bri desencadenado el dolor de [place], negrito ciertos alimentos (chocolate, queso, vino) u olores, humo, luces brillantes, estrés o falta de sueño. · Encuentre maneras saludables de [facility]. Los daniel de [place] son más comunes alessandra o bre después de un momento estresante. Tómese un tiempo para relajarse antes y después de hacer algo que le haya causado un dolor de michelle en el pasado. · Trate de mantener shivani músculos relajados mediante charis buena postura. Revise si tiene [employer] de la [place], la don, el julius y los hombros y trate de relajarlos. Cuando se siente en un escritorio, cambie de posición con frecuencia y estírese por 27 segundos cada hora. · Juliet suficiente ejercicio y duerma bastante. · Coma en forma regular y jen. Largos períodos sin comer pueden provocar un dolor de michelle. · Regálese un masaje. Algunas personas encuentran que los masajes hechos con regularidad son Mammie Broach para aliviar la tensión. · Limite la cafeína. No aleksandar demasiado café, té ni sodas. Assha no deje de consumir cafeína de repente, porque eso también puede provocarle daniel de [place]. · Reduzca la tensión en los ojos a causa de la computadora parpadeando con frecuencia y apartando la mirada de la pantalla a menudo.  Asegúrese de tener lentes adecuados y de que lee monitor esté colocado de [employer], negrito a un brazo de distancia. · Busque ayuda si tiene depresión o ansiedad. Tana daniel de [place] podrían relacionarse con estas afecciones. El tratamiento puede prevenir los daniel de [place] y ayudar con los síntomas de ansiedad o depresión. ¿Cuándo debe pedir ayuda? Llame al 911 en cualquier momento que piense que puede necesitar atención de emergencia. Por ejemplo, llame si:  ? · Tiene señales de un ataque cerebral. Estas pueden incluir:  ¨ Parálisis, entumecimiento o debilidad repentinos en la don, el brazo o la pierna, sobre todo si ocurre en un solo lado del cuerpo. ¨ Cambios repentinos en la visión. ¨ Dificultades repentinas para hablar. ¨ Confusión repentina o dificultad para comprender frases sencillas. ¨ Problemas repentinos para caminar o mantener el equilibrio. ¨ Dolor de [place] intenso y repentino, distinto de los daniel de michelle anteriores. ?Llame a lee médico ahora mismo o busque atención médica inmediata si:  ? · Tiene un dolor de michelle nuevo o peor. ? · Lee dolor de michelle [facility] [employer]. ¿Dónde puede encontrar más información en inglés? Hemalatha Helm a http://yasmin-carly.info/. Escriba X528 en la búsqueda para aprender más acerca de \"Dolor de michelle: Instrucciones de cuidado - [ Headache: Care Instructions ]. \"  Revisado: 14 octubre, 2016  Versión del contenido: 11.4  © 8126-0264 Healthwise, [employer]. Las instrucciones de cuidado fueron adaptadas bajo licencia por Good Help Connections (which disclaims liability or warranty for this information). Si usted tiene [place] afección médica o sobre estas instrucciones, siempre pregunte a lee profesional de jelly. [facility], Incorporated niega toda garantía o responsabilidad por lee uso de esta información.

## 2018-04-24 ENCOUNTER — HOSPITAL ENCOUNTER (EMERGENCY)
Age: 48
Discharge: HOME | End: 2018-04-24
Attending: EMERGENCY MEDICINE
Payer: SELF-PAY

## 2018-04-24 ENCOUNTER — APPOINTMENT (OUTPATIENT)
Dept: ULTRASOUND IMAGING | Age: 48
End: 2018-04-24
Attending: EMERGENCY MEDICINE
Payer: SELF-PAY

## 2018-04-24 VITALS
BODY MASS INDEX: 26.68 KG/M2 | HEIGHT: 62 IN | OXYGEN SATURATION: 98 % | DIASTOLIC BLOOD PRESSURE: 92 MMHG | HEART RATE: 91 BPM | RESPIRATION RATE: 18 BRPM | SYSTOLIC BLOOD PRESSURE: 163 MMHG | WEIGHT: 145 LBS | TEMPERATURE: 98.7 F

## 2018-04-24 DIAGNOSIS — T83.9XXA COMPLICATION OF INTRAUTERINE DEVICE (IUD), UNSPECIFIED COMPLICATION, INITIAL ENCOUNTER (HCC): Primary | ICD-10-CM

## 2018-04-24 DIAGNOSIS — N80.03 ADENOMYOSIS: ICD-10-CM

## 2018-04-24 LAB
ALBUMIN SERPL-MCNC: 3.7 G/DL (ref 3.5–5)
ALBUMIN/GLOB SERPL: 0.9 {RATIO} (ref 1.2–3.5)
ALP SERPL-CCNC: 88 U/L (ref 50–136)
ALT SERPL-CCNC: 17 U/L (ref 12–65)
ANION GAP SERPL CALC-SCNC: 8 MMOL/L (ref 7–16)
AST SERPL-CCNC: 12 U/L (ref 15–37)
BASOPHILS # BLD: 0.1 K/UL (ref 0–0.2)
BASOPHILS NFR BLD: 1 % (ref 0–2)
BILIRUB SERPL-MCNC: 0.2 MG/DL (ref 0.2–1.1)
BUN SERPL-MCNC: 11 MG/DL (ref 6–23)
CALCIUM SERPL-MCNC: 8.5 MG/DL (ref 8.3–10.4)
CHLORIDE SERPL-SCNC: 107 MMOL/L (ref 98–107)
CO2 SERPL-SCNC: 29 MMOL/L (ref 21–32)
CREAT SERPL-MCNC: 0.74 MG/DL (ref 0.6–1)
DIFFERENTIAL METHOD BLD: ABNORMAL
EOSINOPHIL # BLD: 0.8 K/UL (ref 0–0.8)
EOSINOPHIL NFR BLD: 8 % (ref 0.5–7.8)
ERYTHROCYTE [DISTWIDTH] IN BLOOD BY AUTOMATED COUNT: 16.1 % (ref 11.9–14.6)
GLOBULIN SER CALC-MCNC: 4.2 G/DL (ref 2.3–3.5)
GLUCOSE SERPL-MCNC: 106 MG/DL (ref 65–100)
HCG UR QL: NEGATIVE
HCT VFR BLD AUTO: 36.4 % (ref 35.8–46.3)
HGB BLD-MCNC: 11.1 G/DL (ref 11.7–15.4)
IMM GRANULOCYTES # BLD: 0 K/UL (ref 0–0.5)
IMM GRANULOCYTES NFR BLD AUTO: 0 % (ref 0–5)
LYMPHOCYTES # BLD: 2.5 K/UL (ref 0.5–4.6)
LYMPHOCYTES NFR BLD: 26 % (ref 13–44)
MCH RBC QN AUTO: 24.2 PG (ref 26.1–32.9)
MCHC RBC AUTO-ENTMCNC: 30.5 G/DL (ref 31.4–35)
MCV RBC AUTO: 79.5 FL (ref 79.6–97.8)
MONOCYTES # BLD: 0.5 K/UL (ref 0.1–1.3)
MONOCYTES NFR BLD: 6 % (ref 4–12)
NEUTS SEG # BLD: 5.6 K/UL (ref 1.7–8.2)
NEUTS SEG NFR BLD: 59 % (ref 43–78)
PLATELET # BLD AUTO: 303 K/UL (ref 150–450)
PMV BLD AUTO: 11.3 FL (ref 10.8–14.1)
POTASSIUM SERPL-SCNC: 3.8 MMOL/L (ref 3.5–5.1)
PROT SERPL-MCNC: 7.9 G/DL (ref 6.3–8.2)
RBC # BLD AUTO: 4.58 M/UL (ref 4.05–5.25)
SERVICE CMNT-IMP: NORMAL
SODIUM SERPL-SCNC: 144 MMOL/L (ref 136–145)
WBC # BLD AUTO: 9.5 K/UL (ref 4.3–11.1)
WET PREP GENITAL: NORMAL
WET PREP GENITAL: NORMAL

## 2018-04-24 PROCEDURE — 80053 COMPREHEN METABOLIC PANEL: CPT | Performed by: EMERGENCY MEDICINE

## 2018-04-24 PROCEDURE — 87210 SMEAR WET MOUNT SALINE/INK: CPT | Performed by: EMERGENCY MEDICINE

## 2018-04-24 PROCEDURE — 85025 COMPLETE CBC W/AUTO DIFF WBC: CPT | Performed by: EMERGENCY MEDICINE

## 2018-04-24 PROCEDURE — 76856 US EXAM PELVIC COMPLETE: CPT

## 2018-04-24 PROCEDURE — 99284 EMERGENCY DEPT VISIT MOD MDM: CPT | Performed by: EMERGENCY MEDICINE

## 2018-04-24 PROCEDURE — 74011250636 HC RX REV CODE- 250/636: Performed by: EMERGENCY MEDICINE

## 2018-04-24 PROCEDURE — 96374 THER/PROPH/DIAG INJ IV PUSH: CPT | Performed by: EMERGENCY MEDICINE

## 2018-04-24 PROCEDURE — 81003 URINALYSIS AUTO W/O SCOPE: CPT | Performed by: EMERGENCY MEDICINE

## 2018-04-24 PROCEDURE — 87491 CHLMYD TRACH DNA AMP PROBE: CPT | Performed by: EMERGENCY MEDICINE

## 2018-04-24 PROCEDURE — 81025 URINE PREGNANCY TEST: CPT

## 2018-04-24 RX ORDER — KETOROLAC TROMETHAMINE 30 MG/ML
30 INJECTION, SOLUTION INTRAMUSCULAR; INTRAVENOUS
Status: COMPLETED | OUTPATIENT
Start: 2018-04-24 | End: 2018-04-24

## 2018-04-24 RX ORDER — TRAMADOL HYDROCHLORIDE 50 MG/1
50 TABLET ORAL
Qty: 20 TAB | Refills: 0 | Status: SHIPPED | OUTPATIENT
Start: 2018-04-24

## 2018-04-24 RX ADMIN — KETOROLAC TROMETHAMINE 30 MG: 30 INJECTION, SOLUTION INTRAMUSCULAR at 14:17

## 2018-04-24 NOTE — DISCHARGE INSTRUCTIONS
Endometriosis: Instrucciones de cuidado - [ Endometriosis: Care Instructions ]  Instrucciones de cuidado    Algunas células que son parecidas a las que hay en el revestimiento interior de lee matriz (útero) a veces se desarrollan fuera de ésta. A esto se le llama endometriosis. Esta aglutinación de células puede causar dolor y problemas alessandra la menstruación. Se pueden inflamar y podrían sangrar. El tejido cicatricial que se forma con el tiempo puede dificultarle Alexis Lobstein. Los medicamentos y algunas veces la cirugía pueden aliviar el dolor y ayudar a las mujeres que deseen Alexis Lobstein. La atención de seguimiento es charis parte clave de lee tratamiento y seguridad. Asegúrese de hacer y acudir a todas las citas, y llame a lee médico si está teniendo problemas. También es charis buena idea saber los resultados de los exámenes y mantener charis lista de los medicamentos que eulalia. ¿Cómo puede cuidarse en el hogar? · [employer] medicamentos exactamente negrito le fueron recetados. Llame a lee médico si desiree estar teniendo problemas con lee medicamento. · [employer] analgésicos (medicamentos para el dolor) exactamente según las indicaciones. ¨ Si el médico le recetó un analgésico, tómelo según las indicaciones. ¨ Si no está tomando un analgésico recetado, pregúntele a lee médico si puede lucero un medicamento de [employer]. · Aplíquese calor, por ejemplo, con charis bolsa de [place] o charis almohadilla térmica a temperatura baja, sobre la parte inferior del vientre. O tome un baño de agua tibia. El calor podría aliviar el dolor. · Acuéstese y [employer] las piernas colocando charis almohada debajo de las rodillas. [place] ayudará a aliviar la presión [employer]. ¿Cuándo debe pedir ayuda? Llame al 911 en cualquier momento que considere que necesita atención de emergencia. Por ejemplo, llame si:  ? · Se desmayó (perdió el conocimiento).    ?Llame a lee médico ahora mismo o busque atención médica inmediata si:  ? · Tiene dolor repentino e intenso en el vientre o la pelvis. ?Preste especial atención a los cambios en lee jelly y asegúrese de comunicarse con lee médico si:  ? · Tiene un dolor nuevo en el vientre o la pelvis. ? · Piensa que podría estar embarazada. ? · No mejora negrito se esperaba. ¿Dónde puede encontrar más información en inglés? Ayana Stallings a http://yasmin-carly.info/. Brandon Corona R442 en la búsqueda para aprender más acerca de \"Endometriosis: Instrucciones de cuidado - [ Endometriosis: Care Instructions ]. \"  Revisado: 13 octubre, 2016  Versión del contenido: 11.4  © 5521-3597 Healthwise, Incorporated. Las instrucciones de cuidado fueron adaptadas bajo licencia por Good Help Connections (which disclaims liability or warranty for this information). Si usted tiene [place] afección médica o sobre estas instrucciones, siempre pregunte a lee profesional de jelly. [employer] niega toda garantía o responsabilidad por lee uso de esta información.

## 2018-04-24 NOTE — LETTER
[facility] [address] [id number] [phone] Work/School Note Date: 4/24/2018 To Whom It May concern: 
 
Alfie Lei was seen and treated today in the emergency room by the following provider(s): 
Attending Provider: Regina Lozano MD.   
 
Ruslan Yisel Harjinder Sharma may return to school on 4/24/18.  
 
Sincerely, 
 
 
 
 
Miguel Loyola RN

## 2018-04-24 NOTE — ED NOTES
I have reviewed discharge instructions with the patient. The patient verbalized understanding. Patient left ED via Discharge Method: ambulatory to Home with self  Opportunity for questions and clarification provided. Patient given 1 scripts. To continue your aftercare when you leave the hospital, you may receive an automated call from our care team to check in on how you are doing. This is a free service and part of our promise to provide the best care and service to meet your aftercare needs.  If you have questions, or wish to unsubscribe from this service please call [phone]. Thank you for Choosing our [facility] Emergency Department.

## 2018-04-24 NOTE — ED PROVIDER NOTES
HPI Comments: Patient G3 A1 P2. Has an IUD in place for the past 5 years. For the past couple months has had some increased pain in the pelvic area sharp in nature. Worse for the past couple days. Has also had increased vaginal bleeding with her periods. No dysuria. No vaginal discharge. Mild lower abdominal pain. Patient is a 49 y.o. female presenting with pelvic pain. The history is provided by the patient. A  was used. Pelvic Pain    This is a new problem. The current episode started more than 2 days ago. The problem occurs constantly. The problem has been gradually worsening. The pain is associated with an unknown factor. The pain is located in the suprapubic region. The quality of the pain is cramping and sharp. The pain is moderate. Associated symptoms include nausea. Pertinent negatives include no fever, no diarrhea, no melena, no vomiting, no constipation, no dysuria, no hematuria, no headaches, no chest pain and no back pain. Nothing worsens the pain. The pain is relieved by nothing. Past Medical History:   Diagnosis Date    HTN (hypertension) 4/12/2013    Hypertension     no meds    Medical non-compliance 9/8/2017    Missed ab     6wks    Other ill-defined conditions(799.89)     kidney stones    Sepsis ([facility] [address] 3/6/2017       History reviewed. No pertinent surgical history. History reviewed. No pertinent family history. Social History     Social History    Marital status: SINGLE     Spouse name: N/A    Number of children: N/A    Years of education: N/A     Occupational History    Not on file. Social History Main Topics    Smoking status: Former Smoker    Smokeless tobacco: Never Used    Alcohol use No    Drug use: No    Sexual activity: No     Other Topics Concern    Not on file     Social History Narrative         ALLERGIES: Amoxicillin and Morphine    Review of Systems   Constitutional: Negative for chills and fever.    HENT: Negative for rhinorrhea and sore throat. Eyes: Negative for pain and redness. Respiratory: Negative for chest tightness, shortness of breath and wheezing. Cardiovascular: Negative for chest pain and leg swelling. Gastrointestinal: Positive for nausea. Negative for abdominal pain, constipation, diarrhea, melena and vomiting. Genitourinary: Positive for pelvic pain and vaginal bleeding. Negative for dysuria, hematuria and vaginal discharge. Musculoskeletal: Negative for back pain, gait problem, neck pain and neck stiffness. Skin: Negative for color change and rash. Neurological: Negative for weakness, numbness and headaches. Vitals:    04/24/18 1332   BP: 150/85   Pulse: 95   Resp: 17   Temp: 98.7 °F (37.1 °C)   SpO2: 98%   Weight: 65.8 kg (145 lb)   Height: 5' 2\" (1.575 m)            Physical Exam   Constitutional: She is oriented to person, place, and time. She appears well-developed and well-nourished. HENT:   Head: Normocephalic and atraumatic. Neck: Normal range of motion. Neck supple. Cardiovascular: Normal rate and regular rhythm. No murmur heard. Pulmonary/Chest: Effort normal and breath sounds normal. She has no wheezes. Abdominal: Soft. Bowel sounds are normal. There is tenderness (suprapubic). Genitourinary: Cervix exhibits no motion tenderness, no discharge and no friability. Right adnexum displays no mass and no tenderness. Left adnexum displays no mass and no tenderness. There is tenderness and bleeding in the vagina. No foreign body in the vagina. No vaginal discharge found. Genitourinary Comments: Unable to locate IUD string. Patient has tenderness around the base of the cervix at the uterus. Musculoskeletal: Normal range of motion. She exhibits no edema. Neurological: She is alert and oriented to person, place, and time. Skin: Skin is warm and dry. Nursing note and vitals reviewed.        MDM  Number of Diagnoses or Management Options  Diagnosis management comments: Patient with pelvic pain and IUD in place. We'll have oncoming doc follow-up ultrasound results.        Amount and/or Complexity of Data Reviewed  Clinical lab tests: ordered and reviewed  Tests in the radiology section of CPT®: ordered and reviewed  Tests in the medicine section of CPT®: ordered and reviewed    Patient Progress  Patient progress: stable        ED Course       Procedures

## 2018-04-26 LAB
C TRACH RRNA SPEC QL NAA+PROBE: NEGATIVE
N GONORRHOEA RRNA SPEC QL NAA+PROBE: NEGATIVE
SPECIMEN SOURCE: NORMAL

## 2018-11-25 ENCOUNTER — APPOINTMENT (EMERGENCY)
Dept: CT IMAGING | Facility: HOSPITAL | Age: 48
End: 2018-11-25

## 2018-11-25 ENCOUNTER — HOSPITAL ENCOUNTER (EMERGENCY)
Facility: HOSPITAL | Age: 48
Discharge: HOME | End: 2018-11-26
Attending: EMERGENCY MEDICINE

## 2018-11-25 VITALS
RESPIRATION RATE: 18 BRPM | HEART RATE: 74 BPM | SYSTOLIC BLOOD PRESSURE: 117 MMHG | TEMPERATURE: 97.9 F | DIASTOLIC BLOOD PRESSURE: 67 MMHG | OXYGEN SATURATION: 98 %

## 2018-11-25 DIAGNOSIS — R10.84 COLICKY ABDOMINAL PAIN: Primary | ICD-10-CM

## 2018-11-25 LAB
ALBUMIN SERPL BCP-MCNC: 3.4 G/DL (ref 3.5–5)
ALP SERPL-CCNC: 78 U/L (ref 46–116)
ALT SERPL W P-5'-P-CCNC: 18 U/L (ref 12–78)
ANION GAP SERPL CALCULATED.3IONS-SCNC: 8 MMOL/L (ref 4–13)
AST SERPL W P-5'-P-CCNC: 22 U/L (ref 5–45)
BACTERIA UR QL AUTO: ABNORMAL /HPF
BASOPHILS # BLD AUTO: 0.13 THOUSANDS/ΜL (ref 0–0.1)
BASOPHILS NFR BLD AUTO: 1 % (ref 0–1)
BILIRUB SERPL-MCNC: 0.29 MG/DL (ref 0.2–1)
BILIRUB UR QL STRIP: NEGATIVE
BUN SERPL-MCNC: 12 MG/DL (ref 5–25)
CALCIUM SERPL-MCNC: 8.8 MG/DL (ref 8.3–10.1)
CHLORIDE SERPL-SCNC: 100 MMOL/L (ref 100–108)
CLARITY UR: CLEAR
CO2 SERPL-SCNC: 29 MMOL/L (ref 21–32)
COLOR UR: YELLOW
CREAT SERPL-MCNC: 0.79 MG/DL (ref 0.6–1.3)
EOSINOPHIL # BLD AUTO: 0.49 THOUSAND/ΜL (ref 0–0.61)
EOSINOPHIL NFR BLD AUTO: 5 % (ref 0–6)
ERYTHROCYTE [DISTWIDTH] IN BLOOD BY AUTOMATED COUNT: 17.1 % (ref 11.6–15.1)
EXT PREG TEST URINE: NEGATIVE
GFR SERPL CREATININE-BSD FRML MDRD: 88 ML/MIN/1.73SQ M
GLUCOSE SERPL-MCNC: 132 MG/DL (ref 65–140)
GLUCOSE UR STRIP-MCNC: NEGATIVE MG/DL
HCT VFR BLD AUTO: 33.6 % (ref 34.8–46.1)
HGB BLD-MCNC: 9.6 G/DL (ref 11.5–15.4)
HGB UR QL STRIP.AUTO: ABNORMAL
IMM GRANULOCYTES # BLD AUTO: 0.03 THOUSAND/UL (ref 0–0.2)
IMM GRANULOCYTES NFR BLD AUTO: 0 % (ref 0–2)
KETONES UR STRIP-MCNC: NEGATIVE MG/DL
LEUKOCYTE ESTERASE UR QL STRIP: NEGATIVE
LIPASE SERPL-CCNC: 175 U/L (ref 73–393)
LYMPHOCYTES # BLD AUTO: 3.42 THOUSANDS/ΜL (ref 0.6–4.47)
LYMPHOCYTES NFR BLD AUTO: 34 % (ref 14–44)
MCH RBC QN AUTO: 22.3 PG (ref 26.8–34.3)
MCHC RBC AUTO-ENTMCNC: 28.6 G/DL (ref 31.4–37.4)
MCV RBC AUTO: 78 FL (ref 82–98)
MONOCYTES # BLD AUTO: 0.69 THOUSAND/ΜL (ref 0.17–1.22)
MONOCYTES NFR BLD AUTO: 7 % (ref 4–12)
NEUTROPHILS # BLD AUTO: 5.35 THOUSANDS/ΜL (ref 1.85–7.62)
NEUTS SEG NFR BLD AUTO: 53 % (ref 43–75)
NITRITE UR QL STRIP: NEGATIVE
NON-SQ EPI CELLS URNS QL MICRO: ABNORMAL /HPF
NRBC BLD AUTO-RTO: 0 /100 WBCS
PH UR STRIP.AUTO: 5.5 [PH] (ref 4.5–8)
PLATELET # BLD AUTO: 305 THOUSANDS/UL (ref 149–390)
PMV BLD AUTO: 11.4 FL (ref 8.9–12.7)
POTASSIUM SERPL-SCNC: 4.4 MMOL/L (ref 3.5–5.3)
PROT SERPL-MCNC: 7.6 G/DL (ref 6.4–8.2)
PROT UR STRIP-MCNC: NEGATIVE MG/DL
RBC # BLD AUTO: 4.3 MILLION/UL (ref 3.81–5.12)
RBC #/AREA URNS AUTO: ABNORMAL /HPF
SODIUM SERPL-SCNC: 137 MMOL/L (ref 136–145)
SP GR UR STRIP.AUTO: 1.01 (ref 1–1.03)
UROBILINOGEN UR QL STRIP.AUTO: 0.2 E.U./DL
WBC # BLD AUTO: 10.11 THOUSAND/UL (ref 4.31–10.16)
WBC #/AREA URNS AUTO: ABNORMAL /HPF

## 2018-11-25 PROCEDURE — 83690 ASSAY OF LIPASE: CPT | Performed by: PHYSICIAN ASSISTANT

## 2018-11-25 PROCEDURE — 96374 THER/PROPH/DIAG INJ IV PUSH: CPT

## 2018-11-25 PROCEDURE — 36415 COLL VENOUS BLD VENIPUNCTURE: CPT | Performed by: PHYSICIAN ASSISTANT

## 2018-11-25 PROCEDURE — 81001 URINALYSIS AUTO W/SCOPE: CPT

## 2018-11-25 PROCEDURE — 99284 EMERGENCY DEPT VISIT MOD MDM: CPT

## 2018-11-25 PROCEDURE — 74177 CT ABD & PELVIS W/CONTRAST: CPT

## 2018-11-25 PROCEDURE — 96375 TX/PRO/DX INJ NEW DRUG ADDON: CPT

## 2018-11-25 PROCEDURE — 80053 COMPREHEN METABOLIC PANEL: CPT | Performed by: PHYSICIAN ASSISTANT

## 2018-11-25 PROCEDURE — 85025 COMPLETE CBC W/AUTO DIFF WBC: CPT | Performed by: PHYSICIAN ASSISTANT

## 2018-11-25 PROCEDURE — 96361 HYDRATE IV INFUSION ADD-ON: CPT

## 2018-11-25 PROCEDURE — 81025 URINE PREGNANCY TEST: CPT | Performed by: PHYSICIAN ASSISTANT

## 2018-11-25 RX ORDER — KETOROLAC TROMETHAMINE 30 MG/ML
15 INJECTION, SOLUTION INTRAMUSCULAR; INTRAVENOUS ONCE
Status: COMPLETED | OUTPATIENT
Start: 2018-11-25 | End: 2018-11-25

## 2018-11-25 RX ORDER — ONDANSETRON 2 MG/ML
4 INJECTION INTRAMUSCULAR; INTRAVENOUS ONCE
Status: COMPLETED | OUTPATIENT
Start: 2018-11-25 | End: 2018-11-25

## 2018-11-25 RX ADMIN — ONDANSETRON 4 MG: 2 INJECTION INTRAMUSCULAR; INTRAVENOUS at 22:35

## 2018-11-25 RX ADMIN — KETOROLAC TROMETHAMINE 15 MG: 30 INJECTION, SOLUTION INTRAMUSCULAR at 22:36

## 2018-11-25 RX ADMIN — SODIUM CHLORIDE 1000 ML: 0.9 INJECTION, SOLUTION INTRAVENOUS at 22:34

## 2018-11-25 RX ADMIN — IOHEXOL 100 ML: 350 INJECTION, SOLUTION INTRAVENOUS at 23:48

## 2018-11-26 RX ORDER — DICYCLOMINE HCL 20 MG
20 TABLET ORAL EVERY 6 HOURS PRN
Qty: 20 TABLET | Refills: 0 | Status: SHIPPED | OUTPATIENT
Start: 2018-11-26 | End: 2019-04-25

## 2018-11-26 NOTE — DISCHARGE INSTRUCTIONS
There is no obvious abnormality on the CT scan suggesting a source of ear pain  However given the location, it is possibly her gallbladder is the source  I have placed an order for an outpatient ultrasound to be obtained this week for further evaluation  He should follow up with a primary care physician within the next week for further evaluation of your pain  Please do not hesitate to return to the emergency department if he develops vomiting, fever, or uncontrollable pain  Abdominal Pain   WHAT YOU NEED TO KNOW:   Abdominal pain can be dull, achy, or sharp  You may have pain in one area of your abdomen, or in your entire abdomen  Your pain may be caused by a condition such as constipation, food sensitivity or poisoning, infection, or a blockage  Abdominal pain can also be from a hernia, appendicitis, or an ulcer  Liver, gallbladder, or kidney conditions can also cause abdominal pain  The cause of your abdominal pain may be unknown  DISCHARGE INSTRUCTIONS:   Return to the emergency department if:   · You have new chest pain or shortness of breath  · You have pulsing pain in your upper abdomen or lower back that suddenly becomes constant  · Your pain is in the right lower abdominal area and worsens with movement  · You have a fever over 100 4°F (38°C) or shaking chills  · You are vomiting and cannot keep food or liquids down  · Your pain does not improve or gets worse over the next 8 to 12 hours  · You see blood in your vomit or bowel movements, or they look black and tarry  · Your skin or the whites of your eyes turn yellow  · You are a woman and have a large amount of vaginal bleeding that is not your monthly period  Contact your healthcare provider if:   · You have pain in your lower back  · You are a man and have pain in your testicles  · You have pain when you urinate  · You have questions or concerns about your condition or care    Follow up with your healthcare provider within 24 hours or as directed:  Write down your questions so you remember to ask them during your visits  Medicines:   · Medicines  may be given to calm your stomach and prevent vomiting or to decrease pain  Ask how to take pain medicine safely  · Take your medicine as directed  Contact your healthcare provider if you think your medicine is not helping or if you have side effects  Tell him of her if you are allergic to any medicine  Keep a list of the medicines, vitamins, and herbs you take  Include the amounts, and when and why you take them  Bring the list or the pill bottles to follow-up visits  Carry your medicine list with you in case of an emergency  © 2017 [employer] Information is for End User's use only and may not be sold, redistributed or otherwise used for commercial purposes  All illustrations and images included in CareNotes® are the copyrighted property of [employer]  or Bhupinder Doran  The above information is an  only  It is not intended as medical advice for individual conditions or treatments  Talk to your doctor, nurse or pharmacist before following any medical regimen to see if it is safe and effective for you

## 2018-11-26 NOTE — ED NOTES
Reports no nausea and no pain at this time  Talking with someone on the phone       Layo Pennington RN  11/25/18 5840

## 2018-11-26 NOTE — ED PROVIDER NOTES
History  Chief Complaint   Patient presents with    Abdominal Pain     RUQ pain into back also HA and nausea  pain is intermittent started this am       21-year-old female with history of hypertension presents emergency department for evaluation acute onset, gradually worsening right-sided abdominal pain times 24  Pain is currently rated 7 of 10, with frequent increases to 9/10 nonradiating, without modifying factors  She has never experienced similar pain  She reports associated headache and nausea without vomiting, as well as constipation  Denies associated fever, chills, sweats, chest pain, shortness of breath, diarrhea, lower urinary tract symptoms, leg swelling  She denies history of abdominal surgeries  She has not taken any over-the-counter medications for pain control  No ill contacts at home  Denies recent international travel  On exam, patient is overall well appearing, afebrile, no apparent distress  Abdomen is generally soft, focally tender in the left lower quadrant, without peritoneal signs  Bindu Goldmann sign is negative, no tenderness at McBurney's point  Cardiac and lung exams are otherwise benign  There is no peripheral edema, peripheral pulses are 2+ globally  Assessment/plan:  Acute abdominal pain  Given focal tenderness left lower quadrant as well as associated bowel movement changes, plan to obtain CT abdomen pelvis to rule out diverticulitis  We will obtain baseline labs as well as lipase further workup  We will medicate with IV ketorolac and ondansetron for symptom control  None       Past Medical History:   Diagnosis Date    Hypertension        Past Surgical History:   Procedure Laterality Date    NO PAST SURGERIES         History reviewed  No pertinent family history  I have reviewed and agree with the history as documented      Social History   Substance Use Topics    Smoking status: Never Smoker    Smokeless tobacco: Never Used    Alcohol use No Review of Systems   Constitutional: Negative for chills, diaphoresis and fever  Respiratory: Negative for chest tightness and shortness of breath  Cardiovascular: Negative for chest pain and palpitations  Gastrointestinal: Positive for abdominal pain and nausea  Negative for blood in stool, constipation, diarrhea and vomiting         (-) melena   Genitourinary: Negative for dysuria, flank pain, frequency and hematuria  Musculoskeletal: Negative for arthralgias, back pain and myalgias  Skin: Negative for color change and rash  Allergic/Immunologic: Negative for immunocompromised state  Neurological: Positive for headaches  Negative for dizziness  Hematological: Does not bruise/bleed easily  Psychiatric/Behavioral: Negative for confusion  Physical Exam  Physical Exam   Constitutional: She is oriented to person, place, and time  She appears well-developed and well-nourished  No distress  HENT:   Head: Normocephalic and atraumatic  Mouth/Throat: Oropharynx is clear and moist    Eyes: Pupils are equal, round, and reactive to light  No scleral icterus  Neck: No JVD present  Cardiovascular: Normal rate and regular rhythm  Exam reveals no gallop and no friction rub  No murmur heard  Pulmonary/Chest: No respiratory distress  She has no wheezes  She has no rales  Abdominal: Soft  Bowel sounds are normal  She exhibits distension  She exhibits no mass  There is no tenderness (LLQ)  There is no guarding, no tenderness at McBurney's point and negative Kaplan's sign  Neurological: She is alert and oriented to person, place, and time  Skin: Skin is warm and dry  Capillary refill takes less than 2 seconds  She is not diaphoretic  Psychiatric: She has a normal mood and affect  Her behavior is normal    Vitals reviewed        Vital Signs  ED Triage Vitals   Temperature Pulse Respirations Blood Pressure SpO2   11/25/18 2158 11/25/18 2157 11/25/18 2157 11/25/18 2157 11/25/18 2157   97 9 °F (36 6 °C) 84 18 (!) 171/93 98 %      Temp Source Heart Rate Source Patient Position - Orthostatic VS BP Location FiO2 (%)   11/25/18 2158 -- 11/25/18 2157 11/25/18 2157 --   Oral  Sitting Right arm       Pain Score       11/25/18 2157       7           Vitals:    11/25/18 2157 11/25/18 2307   BP: (!) 171/93 117/67   Pulse: 84 74   Patient Position - Orthostatic VS: Sitting Lying       Visual Acuity      ED Medications  Medications   sodium chloride 0 9 % bolus 1,000 mL (0 mL Intravenous Stopped 11/26/18 0016)   ondansetron (ZOFRAN) injection 4 mg (4 mg Intravenous Given 11/25/18 2235)   ketorolac (TORADOL) injection 15 mg (15 mg Intravenous Given 11/25/18 2236)   iohexol (OMNIPAQUE) 350 MG/ML injection (MULTI-DOSE) 100 mL (100 mL Intravenous Given 11/25/18 2348)       Diagnostic Studies  Results Reviewed     Procedure Component Value Units Date/Time    Urine Microscopic [[id number]]  (Abnormal) Collected:  11/25/18 2237    Lab Status:  Final result Specimen:  Urine from Urine, Other Updated:  11/25/18 2325     RBC, UA 1-2 (A) /hpf      WBC, UA 1-2 (A) /hpf      Epithelial Cells Occasional /hpf      Bacteria, UA Occasional /hpf     Comprehensive metabolic panel [[id number]]  (Abnormal) Collected:  11/25/18 2233    Lab Status:  Final result Specimen:  Blood from Arm, Left Updated:  11/25/18 2309     Sodium 137 mmol/L      Potassium 4 4 mmol/L      Chloride 100 mmol/L      CO2 29 mmol/L      ANION GAP 8 mmol/L      BUN 12 mg/dL      Creatinine 0 79 mg/dL      Glucose 132 mg/dL      Calcium 8 8 mg/dL      AST 22 U/L      ALT 18 U/L      Alkaline Phosphatase 78 U/L      Total Protein 7 6 g/dL      Albumin 3 4 (L) g/dL      Total Bilirubin 0 29 mg/dL      eGFR 88 ml/min/1 73sq m     Narrative:         National Kidney Disease Education Program recommendations are as follows:  GFR calculation is accurate only with a steady state creatinine  Chronic Kidney disease less than 60 ml/min/1 73 sq  meters  Kidney failure less than 15 ml/min/1 73 sq  meters      Lipase [[id number]]  (Normal) Collected:  11/25/18 2233    Lab Status:  Final result Specimen:  Blood from Arm, Left Updated:  11/25/18 2309     Lipase 175 u/L     CBC and differential [[id number]]  (Abnormal) Collected:  11/25/18 2233    Lab Status:  Final result Specimen:  Blood from Arm, Left Updated:  11/25/18 2251     WBC 10 11 Thousand/uL      RBC 4 30 Million/uL      Hemoglobin 9 6 (L) g/dL      Hematocrit 33 6 (L) %      MCV 78 (L) fL      MCH 22 3 (L) pg      MCHC 28 6 (L) g/dL      RDW 17 1 (H) %      MPV 11 4 fL      Platelets 953 Thousands/uL      nRBC 0 /100 WBCs      Neutrophils Relative 53 %      Immat GRANS % 0 %      Lymphocytes Relative 34 %      Monocytes Relative 7 %      Eosinophils Relative 5 %      Basophils Relative 1 %      Neutrophils Absolute 5 35 Thousands/µL      Immature Grans Absolute 0 03 Thousand/uL      Lymphocytes Absolute 3 42 Thousands/µL      Monocytes Absolute 0 69 Thousand/µL      Eosinophils Absolute 0 49 Thousand/µL      Basophils Absolute 0 13 (H) Thousands/µL     POCT urinalysis dipstick [[id number]]  (Abnormal) Resulted:  11/25/18 2242    Lab Status:  Final result Specimen:  Urine Updated:  11/25/18 2242    POCT pregnancy, urine [[id number]]  (Normal) Resulted:  11/25/18 2240    Lab Status:  Final result Updated:  11/25/18 2240     EXT PREG TEST UR (Ref: Negative) negative    ED Urine Macroscopic [[id number]]  (Abnormal) Collected:  11/25/18 2237    Lab Status:  Final result Specimen:  Urine Updated:  11/25/18 2224     Color, UA Yellow     Clarity, UA Clear     pH, UA 5 5     Leukocytes, UA Negative     Nitrite, UA Negative     Protein, UA Negative mg/dl      Glucose, UA Negative mg/dl      Ketones, UA Negative mg/dl      Urobilinogen, UA 0 2 E U /dl      Bilirubin, UA Negative     Blood, UA Trace (A)     Specific [place], UA 1 015    Narrative:       CLINITEK RESULT                 CT abdomen pelvis with contrast   Final Result by Gee Dao DO (11/26 0003)      Markedly enlarged uterus which is likely due to underlying fibroids  Cystic lesion in the right adnexa            Workstation performed: [id number]         US right upper quadrant    (Results Pending)              Procedures  Procedures       Phone Contacts  ED Phone Contact    ED Course  ED Course as of Nov 26 0146   Sun Nov 25, 2018   2332 Pt continues to indicate pain to the RUQ, colicky in nature, however when re-examined, pain is only present in the LLQ  Non peritoneal  Awaiting CT                                MDM  Number of Diagnoses or Management Options  Colicky abdominal pain: new and requires workup  Diagnosis management comments: 58-year-old female presents to the emergency department for evaluation of acute, colicky right upper quadrant pain  There are no associated systemic symptoms  Labs reveal a microcytic, hypochromic anemia, however patient has no symptoms of GI bleed  Otherwise labs are unremarkable  CT scan shows no acute abnormality suggesting cause the symptoms  Patient reported increasing colicky right upper quadrant pain in the department, however she only displayed tenderness to the left lower quadrant  I placed an outpatient nonemergent biliary ultrasound order for her to obtain this week for evaluation of cholelithiasis         Amount and/or Complexity of Data Reviewed  Clinical lab tests: ordered and reviewed  Tests in the radiology section of CPT®: ordered and reviewed  Tests in the medicine section of CPT®: ordered and reviewed  Review and summarize past medical records: yes  Independent visualization of images, tracings, or specimens: yes      CritCare Time    Disposition  Final diagnoses:   Colicky abdominal pain     Time reflects when diagnosis was documented in both MDM as applicable and the Disposition within this note     Time User Action Codes Description Comment    11/26/2018 12:06 AM Lakeshia Foote Add [N07 [id number]] Colicky abdominal pain       ED Disposition     ED Disposition Condition Comment    Discharge  Clair Jaramillo discharge to home/self care  Condition at discharge: Good        Follow-up Information     Follow up With Specialties Details Why Contact Info Additional Betsy Shaileshiwona [address] [place] [facility] Call in 2 days  [address] [facility] [address] [phone] [facility] Emergency Department Emergency Medicine  If symptoms worsen, you develop a fever, or you begin to vomit [address] [facility] ED, [address] [facility] [facility]  , [place], [place], [address]          Discharge Medication List as of 11/26/2018 12:08 AM      START taking these medications    Details   dicyclomine (BENTYL) 20 mg tablet Take 1 tablet (20 mg total) by mouth every 6 (six) hours as needed (pain), Starting Mon 11/26/2018, Print             Outpatient Discharge Orders  US right upper quadrant   Standing Status: Future  Standing Exp   Date: 11/26/22         ED Provider  Electronically Signed by           Darwin Nelson PA-C  11/26/18 7888

## 2019-04-15 NOTE — ED NOTES
TRANSFER - OUT REPORT:    Verbal report given to Natasha Martin RN(name) on Clarence Mercado  being transferred to   (unit) for routine progression of care       Report consisted of patients Situation, Background, Assessment and   Recommendations(SBAR). Information from the following report(s) SBAR, ED Summary, Intake/Output and MAR was reviewed with the receiving nurse. Lines:   Peripheral IV 03/06/17 Right Antecubital (Active)   Site Assessment Clean, dry, & intact 3/6/2017  2:17 AM   Phlebitis Assessment 0 3/6/2017  2:17 AM   Infiltration Assessment 0 3/6/2017  2:17 AM       Peripheral IV 03/06/17 Right Wrist (Active)   Site Assessment Clean, dry, & intact 3/6/2017  2:18 AM   Phlebitis Assessment 0 3/6/2017  2:18 AM   Infiltration Assessment 0 3/6/2017  2:18 AM   Dressing Status Clean, dry, & intact 3/6/2017  2:18 AM        Opportunity for questions and clarification was provided.       Patient transported with:   Monitor  O2 @ 2 liters  Registered Nurse Home

## 2019-04-25 ENCOUNTER — HOSPITAL ENCOUNTER (EMERGENCY)
Facility: HOSPITAL | Age: 49
Discharge: HOME | End: 2019-04-25
Attending: EMERGENCY MEDICINE | Admitting: EMERGENCY MEDICINE

## 2019-04-25 VITALS
OXYGEN SATURATION: 99 % | TEMPERATURE: 97.8 F | SYSTOLIC BLOOD PRESSURE: 128 MMHG | HEART RATE: 90 BPM | WEIGHT: 162.04 LBS | RESPIRATION RATE: 18 BRPM | DIASTOLIC BLOOD PRESSURE: 73 MMHG

## 2019-04-25 DIAGNOSIS — N39.0 UTI (URINARY TRACT INFECTION): ICD-10-CM

## 2019-04-25 DIAGNOSIS — N76.0 VAGINITIS: Primary | ICD-10-CM

## 2019-04-25 LAB
BACTERIA UR QL AUTO: ABNORMAL /HPF
BILIRUB UR QL STRIP: NEGATIVE
CLARITY UR: CLEAR
COLOR UR: YELLOW
EXT PREG TEST URINE: NEGATIVE
GLUCOSE UR STRIP-MCNC: NEGATIVE MG/DL
HGB UR QL STRIP.AUTO: ABNORMAL
KETONES UR STRIP-MCNC: NEGATIVE MG/DL
LEUKOCYTE ESTERASE UR QL STRIP: ABNORMAL
MUCOUS THREADS UR QL AUTO: ABNORMAL
NITRITE UR QL STRIP: NEGATIVE
NON-SQ EPI CELLS URNS QL MICRO: ABNORMAL /HPF
PH UR STRIP.AUTO: 6 [PH] (ref 4.5–8)
PROT UR STRIP-MCNC: NEGATIVE MG/DL
RBC #/AREA URNS AUTO: ABNORMAL /HPF
SP GR UR STRIP.AUTO: >=1.03 (ref 1–1.03)
UROBILINOGEN UR QL STRIP.AUTO: 0.2 E.U./DL
WBC #/AREA URNS AUTO: ABNORMAL /HPF

## 2019-04-25 PROCEDURE — 87591 N.GONORRHOEAE DNA AMP PROB: CPT | Performed by: PHYSICIAN ASSISTANT

## 2019-04-25 PROCEDURE — 81025 URINE PREGNANCY TEST: CPT | Performed by: PHYSICIAN ASSISTANT

## 2019-04-25 PROCEDURE — 99284 EMERGENCY DEPT VISIT MOD MDM: CPT | Performed by: PHYSICIAN ASSISTANT

## 2019-04-25 PROCEDURE — 81001 URINALYSIS AUTO W/SCOPE: CPT

## 2019-04-25 PROCEDURE — 87491 CHLMYD TRACH DNA AMP PROBE: CPT | Performed by: PHYSICIAN ASSISTANT

## 2019-04-25 PROCEDURE — 99283 EMERGENCY DEPT VISIT LOW MDM: CPT

## 2019-04-25 RX ORDER — FLUCONAZOLE 100 MG/1
200 TABLET ORAL ONCE
Status: COMPLETED | OUTPATIENT
Start: 2019-04-25 | End: 2019-04-25

## 2019-04-25 RX ORDER — NITROFURANTOIN 25; 75 MG/1; MG/1
100 CAPSULE ORAL 2 TIMES DAILY
Qty: 10 CAPSULE | Refills: 0 | Status: SHIPPED | OUTPATIENT
Start: 2019-04-25 | End: 2019-04-30

## 2019-04-25 RX ADMIN — FLUCONAZOLE 200 MG: 100 TABLET ORAL at 15:53

## 2019-04-26 LAB
C TRACH DNA SPEC QL NAA+PROBE: NEGATIVE
N GONORRHOEA DNA SPEC QL NAA+PROBE: NEGATIVE

## 2019-05-13 NOTE — ED PROVIDER NOTES
HPI Comments: Patient states she was picking something heavy yesterday when it started to fall. She bent down to catch it and felt a strain in her back. Later on she started having more severe pain in her lower back. She describes it as constant achy nonradiating pain along her lower back. She has not taken any medicine for her symptoms, states the pain is worse when she bends over. She states the pain gets moderate at times. Elements of this note were made using speech recognition software. As such, errors of speech recognition may occur. Patient is a 49 y.o. female presenting with back pain. The history is provided by the patient. Back Pain    Pertinent negatives include no fever. Past Medical History:   Diagnosis Date    HTN (hypertension) 4/12/2013    Hypertension     no meds    Medical non-compliance 9/8/2017    Missed ab     6wks    Other ill-defined conditions(799.89)     kidney stones    Sepsis ([facility] [address] 3/6/2017       History reviewed. No pertinent surgical history. History reviewed. No pertinent family history. Social History     Social History    Marital status: SINGLE     Spouse name: N/A    Number of children: N/A    Years of education: N/A     Occupational History    Not on file. Social History Main Topics    Smoking status: Former Smoker    Smokeless tobacco: Never Used    Alcohol use No    Drug use: No    Sexual activity: No     Other Topics Concern    Not on file     Social History Narrative         ALLERGIES: Amoxicillin and Morphine    Review of Systems   Constitutional: Negative for chills and fever. Gastrointestinal: Negative for nausea and vomiting. Musculoskeletal: Positive for back pain. All other systems reviewed and are negative.       Vitals:    12/29/17 1757   BP: 149/80   Pulse: 94   Resp: 18   Temp: 98 °F (36.7 °C)   SpO2: 98%   Weight: 65.8 kg (145 lb)   Height: 5' 2\" (1.575 m)            Physical Exam   Constitutional: She is oriented to person, place, and time. She appears well-developed and well-nourished. HENT:   Head: Normocephalic and atraumatic. Eyes: Conjunctivae are normal. Pupils are equal, round, and reactive to light. Neck: Normal range of motion. Neck supple. Musculoskeletal: She exhibits tenderness. She exhibits no edema. Back:    Mild tenderness to palpation lower back as indicated   Neurological: She is alert and oriented to person, place, and time. Skin: Skin is warm and dry. Psychiatric: She has a normal mood and affect. Her behavior is normal.   Nursing note and vitals reviewed.        MDM  Number of Diagnoses or Management Options  Acute midline low back pain without sciatica: new and does not require workup  Diagnosis management comments: Differential diagnoses: Back strain/contusion/compression fracture    Risk of Complications, Morbidity, and/or Mortality  Presenting problems: moderate  Diagnostic procedures: moderate  Management options: moderate    Patient Progress  Patient progress: stable    ED Course       Procedures DRY MUCOUS MEMBRANES

## 2020-01-14 ENCOUNTER — APPOINTMENT (OUTPATIENT)
Dept: GENERAL RADIOLOGY | Age: 50
End: 2020-01-14
Attending: EMERGENCY MEDICINE
Payer: SELF-PAY

## 2020-01-14 ENCOUNTER — HOSPITAL ENCOUNTER (EMERGENCY)
Age: 50
Discharge: HOME | End: 2020-01-14
Attending: EMERGENCY MEDICINE
Payer: SELF-PAY

## 2020-01-14 VITALS
HEART RATE: 102 BPM | OXYGEN SATURATION: 96 % | BODY MASS INDEX: 27.6 KG/M2 | HEIGHT: 62 IN | TEMPERATURE: 98.5 F | WEIGHT: 150 LBS | SYSTOLIC BLOOD PRESSURE: 146 MMHG | DIASTOLIC BLOOD PRESSURE: 92 MMHG | RESPIRATION RATE: 16 BRPM

## 2020-01-14 DIAGNOSIS — J01.90 ACUTE SINUSITIS, RECURRENCE NOT SPECIFIED, UNSPECIFIED LOCATION: Primary | ICD-10-CM

## 2020-01-14 DIAGNOSIS — J20.9 ACUTE BRONCHITIS, UNSPECIFIED ORGANISM: ICD-10-CM

## 2020-01-14 LAB
ALBUMIN SERPL-MCNC: 3.6 G/DL (ref 3.5–5)
ALBUMIN/GLOB SERPL: 0.8 {RATIO} (ref 1.2–3.5)
ALP SERPL-CCNC: 93 U/L (ref 50–136)
ALT SERPL-CCNC: 15 U/L (ref 12–65)
ANION GAP SERPL CALC-SCNC: 4 MMOL/L (ref 7–16)
AST SERPL-CCNC: 16 U/L (ref 15–37)
ATRIAL RATE: 105 BPM
BASOPHILS # BLD: 0.1 K/UL (ref 0–0.2)
BASOPHILS NFR BLD: 2 % (ref 0–2)
BILIRUB SERPL-MCNC: 0.3 MG/DL (ref 0.2–1.1)
BUN SERPL-MCNC: 9 MG/DL (ref 6–23)
CALCIUM SERPL-MCNC: 9.1 MG/DL (ref 8.3–10.4)
CALCULATED P AXIS, ECG09: 68 DEGREES
CALCULATED R AXIS, ECG10: 50 DEGREES
CALCULATED T AXIS, ECG11: 48 DEGREES
CHLORIDE SERPL-SCNC: 106 MMOL/L (ref 98–107)
CO2 SERPL-SCNC: 29 MMOL/L (ref 21–32)
CREAT SERPL-MCNC: 0.93 MG/DL (ref 0.6–1)
DIAGNOSIS, 93000: NORMAL
DIFFERENTIAL METHOD BLD: ABNORMAL
EOSINOPHIL # BLD: 0.7 K/UL (ref 0–0.8)
EOSINOPHIL NFR BLD: 9 % (ref 0.5–7.8)
ERYTHROCYTE [DISTWIDTH] IN BLOOD BY AUTOMATED COUNT: 15.6 % (ref 11.9–14.6)
GLOBULIN SER CALC-MCNC: 4.4 G/DL (ref 2.3–3.5)
GLUCOSE SERPL-MCNC: 135 MG/DL (ref 65–100)
HCT VFR BLD AUTO: 36.8 % (ref 35.8–46.3)
HGB BLD-MCNC: 10.5 G/DL (ref 11.7–15.4)
IMM GRANULOCYTES # BLD AUTO: 0 K/UL (ref 0–0.5)
IMM GRANULOCYTES NFR BLD AUTO: 0 % (ref 0–5)
LYMPHOCYTES # BLD: 2 K/UL (ref 0.5–4.6)
LYMPHOCYTES NFR BLD: 26 % (ref 13–44)
MCH RBC QN AUTO: 21.5 PG (ref 26.1–32.9)
MCHC RBC AUTO-ENTMCNC: 28.5 G/DL (ref 31.4–35)
MCV RBC AUTO: 75.4 FL (ref 79.6–97.8)
MONOCYTES # BLD: 0.5 K/UL (ref 0.1–1.3)
MONOCYTES NFR BLD: 7 % (ref 4–12)
NEUTS SEG # BLD: 4.2 K/UL (ref 1.7–8.2)
NEUTS SEG NFR BLD: 56 % (ref 43–78)
NRBC # BLD: 0 K/UL (ref 0–0.2)
P-R INTERVAL, ECG05: 136 MS
PLATELET # BLD AUTO: 373 K/UL (ref 150–450)
PMV BLD AUTO: 11.7 FL (ref 9.4–12.3)
POTASSIUM SERPL-SCNC: 3.7 MMOL/L (ref 3.5–5.1)
PROT SERPL-MCNC: 8 G/DL (ref 6.3–8.2)
Q-T INTERVAL, ECG07: 348 MS
QRS DURATION, ECG06: 74 MS
QTC CALCULATION (BEZET), ECG08: 459 MS
RBC # BLD AUTO: 4.88 M/UL (ref 4.05–5.2)
SODIUM SERPL-SCNC: 139 MMOL/L (ref 136–145)
TROPONIN I SERPL-MCNC: <0.02 NG/ML (ref 0.02–0.05)
VENTRICULAR RATE, ECG03: 105 BPM
WBC # BLD AUTO: 7.5 K/UL (ref 4.3–11.1)

## 2020-01-14 PROCEDURE — 93005 ELECTROCARDIOGRAM TRACING: CPT | Performed by: EMERGENCY MEDICINE

## 2020-01-14 PROCEDURE — 80053 COMPREHEN METABOLIC PANEL: CPT

## 2020-01-14 PROCEDURE — 71046 X-RAY EXAM CHEST 2 VIEWS: CPT

## 2020-01-14 PROCEDURE — 74011636637 HC RX REV CODE- 636/637: Performed by: EMERGENCY MEDICINE

## 2020-01-14 PROCEDURE — 74011250637 HC RX REV CODE- 250/637: Performed by: EMERGENCY MEDICINE

## 2020-01-14 PROCEDURE — 85025 COMPLETE CBC W/AUTO DIFF WBC: CPT

## 2020-01-14 PROCEDURE — 99284 EMERGENCY DEPT VISIT MOD MDM: CPT | Performed by: EMERGENCY MEDICINE

## 2020-01-14 PROCEDURE — 84484 ASSAY OF TROPONIN QUANT: CPT

## 2020-01-14 RX ORDER — FLUTICASONE PROPIONATE 50 MCG
2 SPRAY, SUSPENSION (ML) NASAL DAILY
Qty: 1 BOTTLE | Refills: 0 | Status: SHIPPED | OUTPATIENT
Start: 2020-01-14

## 2020-01-14 RX ORDER — BENZONATATE 100 MG/1
100 CAPSULE ORAL
Status: COMPLETED | OUTPATIENT
Start: 2020-01-14 | End: 2020-01-14

## 2020-01-14 RX ORDER — NAPROXEN 500 MG/1
500 TABLET ORAL AS NEEDED
Qty: 20 TAB | Refills: 0 | Status: SHIPPED | OUTPATIENT
Start: 2020-01-14

## 2020-01-14 RX ORDER — BENZONATATE 100 MG/1
100 CAPSULE ORAL
Qty: 20 CAP | Refills: 0 | Status: SHIPPED | OUTPATIENT
Start: 2020-01-14

## 2020-01-14 RX ADMIN — PREDNISONE 60 MG: 50 TABLET ORAL at 16:43

## 2020-01-14 RX ADMIN — BENZONATATE 100 MG: 100 CAPSULE ORAL at 16:43

## 2020-01-14 NOTE — ED PROVIDER NOTES
58-year-old female states she was out in the rain yesterday. Shortly after, she developed cough productive of white sputum, headache, right ear pain, and lightheadedness. During a coughing fit, her vision went black and she felt like passing out. She has chest pain and shortness of breath with cough. No fever or ill contacts. She reports itching from her right ear. She has been picking at it and noticed a small amount of blood. Headache    Associated symptoms include palpitations and dizziness. Pertinent negatives include no fever, no shortness of breath, no weakness, no nausea and no vomiting. Past Medical History:   Diagnosis Date    HTN (hypertension) 4/12/2013    Hypertension     no meds    Medical non-compliance 9/8/2017    Missed ab     6wks    Other ill-defined conditions(799.89)     kidney stones    Sepsis ([facility] [address] 3/6/2017       History reviewed. No pertinent surgical history. History reviewed. No pertinent family history.     Social History     Socioeconomic History    Marital status: SINGLE     Spouse name: Not on file    Number of children: Not on file    Years of education: Not on file    Highest education level: Not on file   Occupational History    Not on file   Social Needs    Financial resource strain: Not on file    Food insecurity:     Worry: Not on file     Inability: Not on file    Transportation needs:     Medical: Not on file     Non-medical: Not on file   Tobacco Use    Smoking status: Former Smoker    Smokeless tobacco: Never Used   Substance and Sexual Activity    Alcohol use: No    Drug use: No    Sexual activity: Never     Birth control/protection: Implant   Lifestyle    Physical activity:     Days per week: Not on file     Minutes per session: Not on file    Stress: Not on file   Relationships    Social connections:     Talks on phone: Not on file     Gets together: Not on file     Attends Religion service: Not on file     Active member of club or organization: Not on file     Attends meetings of clubs or organizations: Not on file     Relationship status: Not on file    Intimate partner violence:     Fear of current or ex partner: Not on file     Emotionally abused: Not on file     Physically abused: Not on file     Forced sexual activity: Not on file   Other Topics Concern    Not on file   Social History Narrative    Not on file         ALLERGIES: Amoxicillin and Morphine    Review of Systems   Constitutional: Negative for chills and fever. HENT: Positive for congestion, ear pain, sinus pressure and sinus pain. Negative for facial swelling and hearing loss. Eyes: Negative for visual disturbance. Respiratory: Positive for cough. Negative for shortness of breath. Cardiovascular: Positive for palpitations. Negative for chest pain. Gastrointestinal: Negative for abdominal pain, diarrhea, nausea and vomiting. Musculoskeletal: Negative for back pain. Skin: Negative for rash. Neurological: Positive for dizziness, light-headedness and headaches. Negative for weakness and numbness. Psychiatric/Behavioral: Negative for confusion. Vitals:    01/14/20 1320   BP: (!) 146/92   Pulse: (!) 102   Resp: 16   Temp: 98.5 °F (36.9 °C)   SpO2: 96%   Weight: 68 kg (150 lb)   Height: 5' 2\" (1.575 m)            Physical Exam  Vitals signs and nursing note reviewed. Constitutional:       Appearance: She is well-developed. HENT:      Head: Normocephalic and atraumatic. Right Ear: Tympanic membrane normal.      Left Ear: Tympanic membrane normal.      Ears:        Nose: Nose normal.      Right Sinus: No maxillary sinus tenderness or frontal sinus tenderness. Left Sinus: No maxillary sinus tenderness or frontal sinus tenderness. Mouth/Throat:      Mouth: Mucous membranes are moist.   Eyes:      Pupils: Pupils are equal, round, and reactive to light. Neck:      Musculoskeletal: Normal range of motion and neck supple.    Cardiovascular: Rate and Rhythm: Regular rhythm. Heart sounds: Normal heart sounds. Pulmonary:      Effort: Pulmonary effort is normal.      Breath sounds: Normal breath sounds. Abdominal:      Palpations: Abdomen is soft. Tenderness: There is no tenderness. Musculoskeletal: Normal range of motion. Skin:     General: Skin is warm and dry. Neurological:      Mental Status: She is alert. Psychiatric:         Behavior: Behavior normal.          MDM  Number of Diagnoses or Management Options  Diagnosis management comments: Parts of this document were created using dragon voice recognition software. The chart has been reviewed but errors may still be present. No indication for abx. Likely viral sinusitis     I discussed the results of all labs, procedures, radiographs, and treatments with the patient and available family. Treatment plan is agreed upon and the patient is ready for discharge. Questions about treatment in the ED and differential diagnosis of presenting condition were answered. Patient was given verbal discharge instructions including, but not limited to, importance of returning to the emergency department for any concern of worsening or continued symptoms. Instructions were given to follow up with a primary care provider or specialist within 1-2 days. Adverse effects of medications, if prescribed, were discussed and patient was advised to refrain from significant physical activity until followed up by primary care physician and to not drive or operate heavy machinery after taking any sedating substances.            Amount and/or Complexity of Data Reviewed  Clinical lab tests: ordered and reviewed (Results for orders placed or performed during the hospital encounter of 01/14/20  -CBC WITH AUTOMATED DIFF       Result                      Value             Ref Range           WBC                         7.5               4.3 - 11.1 K*       RBC                         4.88 4.05 - 5.2 M*       HGB                         10.5 (L)          11.7 - 15.4 *       HCT                         36.8              35.8 - 46.3 %       MCV                         75.4 (L)          79.6 - 97.8 *       MCH                         21.5 (L)          26.1 - 32.9 *       MCHC                        28.5 (L)          31.4 - 35.0 *       RDW                         15.6 (H)          11.9 - 14.6 %       PLATELET                    373               150 - 450 K/*       MPV                         11.7              9.4 - 12.3 FL       ABSOLUTE NRBC               0.00              0.0 - 0.2 K/*       DF                          AUTOMATED                             NEUTROPHILS                 56                43 - 78 %           LYMPHOCYTES                 26                13 - 44 %           MONOCYTES                   7                 4.0 - 12.0 %        EOSINOPHILS                 9 (H)             0.5 - 7.8 %         BASOPHILS                   2                 0.0 - 2.0 %         IMMATURE GRANULOCYTES       0                 0.0 - 5.0 %         ABS. NEUTROPHILS            4.2               1.7 - 8.2 K/*       ABS. LYMPHOCYTES            2.0               0.5 - 4.6 K/*       ABS. MONOCYTES              0.5               0.1 - 1.3 K/*       ABS. EOSINOPHILS            0.7               0.0 - 0.8 K/*       ABS. BASOPHILS              0.1               0.0 - 0.2 K/*       ABS. IMM.  GRANS.            0.0               0.0 - 0.5 K/*  -METABOLIC PANEL, COMPREHENSIVE       Result                      Value             Ref Range           Sodium                      139               136 - 145 mm*       Potassium                   3.7               3.5 - 5.1 mm*       Chloride                    106               98 - 107 mmo*       CO2                         29                21 - 32 mmol*       Anion gap                   4 (L)             7 - 16 mmol/L       Glucose                     135 (H)           65 - 100 mg/*       BUN                         9                 6 - 23 MG/DL        Creatinine                  0.93              0.6 - 1.0 MG*       GFR est AA                  >60               >60 ml/min/1*       GFR est non-AA              >60               >60 ml/min/1*       Calcium                     9.1               8.3 - 10.4 M*       Bilirubin, total            0.3               0.2 - 1.1 MG*       ALT (SGPT)                  15                12 - 65 U/L         AST (SGOT)                  16                15 - 37 U/L         Alk. phosphatase            93                50 - 136 U/L        Protein, total              8.0               6.3 - 8.2 g/*       Albumin                     3.6               3.5 - 5.0 g/*       Globulin                    4.4 (H)           2.3 - 3.5 g/*       A-G Ratio                   0.8 (L)           1.2 - 3.5      -TROPONIN I       Result                      Value             Ref Range           Troponin-I, Qt.             <0.02 (L)         0.02 - 0.05 *  )  Tests in the radiology section of CPT®: ordered and reviewed (Xr Chest Pa Lat    Result Date: 1/14/2020  Two view chest History: chest pain. Comparison: 09/13/2017 Findings: The heart and mediastinal silhouette are normal in size and configuration. The lungs and pleural spaces are clear. The pulmonary vascularity is within normal limits. The visualized osseous structures are unremarkable.      Impression: No active disease in the chest.     )  Tests in the medicine section of CPT®: reviewed and ordered           Procedures

## 2020-01-14 NOTE — ED NOTES
I have reviewed discharge instructions with the patient. The patient verbalized understanding. Patient left ED via Discharge Method: ambulatory to Home with (son). Opportunity for questions and clarification provided. Patient given 3 scripts. To continue your aftercare when you leave the hospital, you may receive an automated call from our care team to check in on how you are doing. This is a free service and part of our promise to provide the best care and service to meet your aftercare needs.  If you have questions, or wish to unsubscribe from this service please call [phone]. Thank you for Choosing our [facility] Emergency Department.

## 2020-01-14 NOTE — ED TRIAGE NOTES
Pt arrives via pov with steady gait to triage with complaints of headache and cp x 2 days. Pt states pain comes and goes and feels like a pressure. Pt states she has hx of htn and anemia. Pt states she takes iron supplements. Pt denies meds for htn control. Pt a/o x 4. Pt also states that since yesterday she has had vision disturbances to where she see's \"blackness\". Pt denies recent falls or injury. Pt denies weakness. Pt with clear speech. Pt in no distress.

## 2022-03-18 PROBLEM — A41.9 SEPSIS (HCC): Status: ACTIVE | Noted: 2017-03-06

## 2022-03-18 PROBLEM — J18.9 CAP (COMMUNITY ACQUIRED PNEUMONIA): Status: ACTIVE | Noted: 2017-03-06

## 2022-03-19 PROBLEM — R07.9 CHEST PAIN: Status: ACTIVE | Noted: 2017-03-06

## 2022-03-19 PROBLEM — R79.89 ELEVATED LACTIC ACID LEVEL: Status: ACTIVE | Noted: 2017-03-06

## 2022-03-19 PROBLEM — D72.829 LEUKOCYTOSIS: Status: ACTIVE | Noted: 2017-03-06

## 2022-03-19 PROBLEM — R09.02 HYPOXEMIA: Status: ACTIVE | Noted: 2017-03-06

## 2022-03-20 PROBLEM — Z91.199 MEDICAL NON-COMPLIANCE: Status: ACTIVE | Noted: 2017-09-08

## 2023-08-24 ENCOUNTER — HOSPITAL ENCOUNTER (EMERGENCY)
Facility: HOSPITAL | Age: 53
Discharge: HOME | End: 2023-08-24
Attending: EMERGENCY MEDICINE

## 2023-08-24 VITALS
OXYGEN SATURATION: 98 % | TEMPERATURE: 98.3 F | RESPIRATION RATE: 18 BRPM | SYSTOLIC BLOOD PRESSURE: 141 MMHG | DIASTOLIC BLOOD PRESSURE: 91 MMHG | HEART RATE: 82 BPM

## 2023-08-24 DIAGNOSIS — L03.011 PARONYCHIA OF FINGER, RIGHT: Primary | ICD-10-CM

## 2023-08-24 DIAGNOSIS — Z86.79 HX OF ESSENTIAL HYPERTENSION: ICD-10-CM

## 2023-08-24 PROCEDURE — 99284 EMERGENCY DEPT VISIT MOD MDM: CPT | Performed by: PHYSICIAN ASSISTANT

## 2023-08-24 PROCEDURE — 90715 TDAP VACCINE 7 YRS/> IM: CPT | Performed by: PHYSICIAN ASSISTANT

## 2023-08-24 PROCEDURE — 10060 I&D ABSCESS SIMPLE/SINGLE: CPT | Performed by: PHYSICIAN ASSISTANT

## 2023-08-24 PROCEDURE — 99282 EMERGENCY DEPT VISIT SF MDM: CPT

## 2023-08-24 RX ORDER — ACETAMINOPHEN 500 MG
500 TABLET ORAL EVERY 6 HOURS PRN
Qty: 30 TABLET | Refills: 0 | Status: SHIPPED | OUTPATIENT
Start: 2023-08-24

## 2023-08-24 RX ORDER — IBUPROFEN 400 MG/1
400 TABLET ORAL EVERY 6 HOURS PRN
Qty: 30 TABLET | Refills: 0 | Status: SHIPPED | OUTPATIENT
Start: 2023-08-24

## 2023-08-24 RX ORDER — HYDROCHLOROTHIAZIDE 25 MG/1
25 TABLET ORAL DAILY
Qty: 30 TABLET | Refills: 0 | Status: SHIPPED | OUTPATIENT
Start: 2023-08-24

## 2023-08-24 RX ORDER — LIDOCAINE HYDROCHLORIDE 10 MG/ML
10 INJECTION, SOLUTION EPIDURAL; INFILTRATION; INTRACAUDAL; PERINEURAL ONCE
Status: COMPLETED | OUTPATIENT
Start: 2023-08-24 | End: 2023-08-24

## 2023-08-24 RX ORDER — IBUPROFEN 400 MG/1
800 TABLET ORAL ONCE
Status: COMPLETED | OUTPATIENT
Start: 2023-08-24 | End: 2023-08-24

## 2023-08-24 RX ORDER — ACETAMINOPHEN 325 MG/1
975 TABLET ORAL ONCE
Status: COMPLETED | OUTPATIENT
Start: 2023-08-24 | End: 2023-08-24

## 2023-08-24 RX ORDER — DOXYCYCLINE HYCLATE 100 MG/1
100 CAPSULE ORAL 2 TIMES DAILY
Qty: 14 CAPSULE | Refills: 0 | Status: SHIPPED | OUTPATIENT
Start: 2023-08-24 | End: 2023-08-31

## 2023-08-24 RX ADMIN — LIDOCAINE HYDROCHLORIDE 10 ML: 10 INJECTION, SOLUTION EPIDURAL; INFILTRATION; INTRACAUDAL at 19:50

## 2023-08-24 RX ADMIN — ACETAMINOPHEN 975 MG: 325 TABLET ORAL at 19:46

## 2023-08-24 RX ADMIN — TETANUS TOXOID, REDUCED DIPHTHERIA TOXOID AND ACELLULAR PERTUSSIS VACCINE, ADSORBED 0.5 ML: 5; 2.5; 8; 8; 2.5 SUSPENSION INTRAMUSCULAR at 20:13

## 2023-08-24 RX ADMIN — IBUPROFEN 800 MG: 400 TABLET, FILM COATED ORAL at 19:46

## 2023-08-24 NOTE — ED PROVIDER NOTES
History  Chief Complaint   Patient presents with   • Hand Pain     L hand middle finger pain, + swelling around nail bed   • Hypertension     Pt here visiting son, ran out of bp medications       History provided by:  Patient   used: No    Hand Pain  Location:  Left middle finger tissue along nail  Severity:  Mild  Onset quality:  Gradual  Timing:  Constant  Progression:  Worsening  Chronicity:  New  Context:  Patient denies specific injury to the finger but does state that she does take care of her nails  Associated symptoms: no fever        None       Past Medical History:   Diagnosis Date   • Hypertension        Past Surgical History:   Procedure Laterality Date   • NO PAST SURGERIES         History reviewed. No pertinent family history. I have reviewed and agree with the history as documented. E-Cigarette/Vaping     E-Cigarette/Vaping Substances     Social History     Tobacco Use   • Smoking status: Never   • Smokeless tobacco: Never   Substance Use Topics   • Alcohol use: No   • Drug use: No       Review of Systems   Constitutional: Negative for fever. Skin: Positive for wound. All other systems reviewed and are negative. Physical Exam  Physical Exam  Vitals and nursing note reviewed. Constitutional:       Appearance: Normal appearance. HENT:      Head: Normocephalic. Right Ear: External ear normal.      Left Ear: External ear normal.      Nose: Nose normal.      Mouth/Throat:      Pharynx: Oropharynx is clear. Eyes:      Conjunctiva/sclera: Conjunctivae normal.   Cardiovascular:      Rate and Rhythm: Normal rate. Pulmonary:      Effort: Pulmonary effort is normal.   Abdominal:      General: There is no distension. Musculoskeletal:         General: Swelling and tenderness present. Hands:       Cervical back: Normal range of motion. Skin:     General: Skin is warm and dry. Capillary Refill: Capillary refill takes less than 2 seconds.    Neurological: General: No focal deficit present. Mental Status: She is alert and oriented to person, place, and time. Vital Signs  ED Triage Vitals   Temperature Pulse Respirations Blood Pressure SpO2   08/24/23 1912 08/24/23 1912 08/24/23 1912 08/24/23 1917 08/24/23 1912   98.3 °F (36.8 °C) 82 18 141/91 98 %      Temp Source Heart Rate Source Patient Position - Orthostatic VS BP Location FiO2 (%)   08/24/23 1912 08/24/23 1912 08/24/23 1912 08/24/23 1912 --   Oral Monitor Sitting Right arm       Pain Score       08/24/23 1946       10 - Worst Possible Pain           Vitals:    08/24/23 1912 08/24/23 1917   BP:  141/91   Pulse: 82    Patient Position - Orthostatic VS: Sitting          Visual Acuity      ED Medications  Medications   lidocaine (PF) (XYLOCAINE-MPF) 1 % injection 10 mL (10 mL Infiltration Given by Other 8/24/23 1950)   ibuprofen (MOTRIN) tablet 800 mg (800 mg Oral Given 8/24/23 1946)   acetaminophen (TYLENOL) tablet 975 mg (975 mg Oral Given 8/24/23 1946)   tetanus-diphtheria-acellular pertussis (BOOSTRIX) IM injection 0.5 mL (0.5 mL Intramuscular Given 8/24/23 2013)       Diagnostic Studies  Results Reviewed     None                 No orders to display              Procedures  Incision and drain    Date/Time: 8/25/2023 9:22 AM    Performed by: Leanna Wilkerson PA-C  Authorized by: Leanna Wilkerson PA-C  Universal Protocol:  Consent: Verbal consent obtained.   Consent given by: patient  Patient understanding: patient states understanding of the procedure being performed  Patient consent: the patient's understanding of the procedure matches consent given  Patient identity confirmed: verbally with patient      Patient location:  ED  Location:     Type:  Abscess (Paronychia left middle finger ulnar border)    Size:  0.5 cm    Location:  Upper extremity    Upper extremity location:  L long finger  Pre-procedure details:     Skin preparation:  Simple soap and water  Anesthesia (see MAR for exact dosages): Anesthesia method:  Local infiltration (Digital block)    Local anesthetic:  Lidocaine 1% w/o epi  Procedure details:     Complexity:  Simple    Incision types:  Stab incision    Approach:  Open    Incision depth:  Subcutaneous    Drainage:  Purulent    Drainage amount:  Scant    Wound treatment:  Wound left open  Post-procedure details:     Patient tolerance of procedure: Tolerated well, no immediate complications             ED Course                               SBIRT 22yo+    Flowsheet Row Most Recent Value   Initial Alcohol Screen: US AUDIT-C     1. How often do you have a drink containing alcohol? 0 Filed at: 08/24/2023 1916   2. How many drinks containing alcohol do you have on a typical day you are drinking? 0 Filed at: 08/24/2023 1916   3a. Male UNDER 65: How often do you have five or more drinks on one occasion? 0 Filed at: 08/24/2023 1916   3b. FEMALE Any Age, or MALE 65+: How often do you have 4 or more drinks on one occassion? 0 Filed at: 08/24/2023 1916   Audit-C Score 0 Filed at: 08/24/2023 1916   DOMINIQUE: How many times in the past year have you. .. Used an illegal drug or used a prescription medication for non-medical reasons? Never Filed at: 08/24/2023 1950                    Medical Decision Making  62-year-old female presents emergency department for left middle finger distal pain redness and warmth. Paronychia I&D performed. Purulent material expressed from wound. Patient tolerated procedure well. Patient states that she is new to town and is currently living with her son from out of state. Patient states that she is out of her hypertensive medicine. Patient presents picture of her needed hypertensive medicine. At this time will provide 30-day supply. Educated patient on the importance of home wound care analgesic medications and need for antibiotic. Patient encouraged to complete antibiotics course.   Patient encouraged to obtain primary care in the [facility] area as she states that she will be currently residing with her son permanently. Educated patient of persistent or worsening signs and symptoms in regards to her finger and to return the emergency department for any concern. Patient admitted understanding and agreement and was discharged in amatory improved condition. Hx of essential hypertension:     Details: Patient requesting medication refill which was provided. Patient educated on need to obtain primary care in the area for chronic prescription needs. Paronychia of finger, right:     Details: History physical and procedure consistent with infectious paronychia. Risk  OTC drugs. Prescription drug management. Disposition  Final diagnoses:   Paronychia of finger, right   Hx of essential hypertension     Time reflects when diagnosis was documented in both MDM as applicable and the Disposition within this note     Time User Action Codes Description Comment    8/24/2023  7:42 PM [facility] Add [F90.210] Paronychia of finger, right     8/24/2023  8:20 PM [facility] Add [Z86.79] Hx of essential hypertension       ED Disposition     ED Disposition   Discharge    Condition   Stable    Date/Time   Thu Aug 24, 2023  8:21 PM    Comment   Vanesa Chin discharge to home/self care.                Follow-up Information     Follow up With Specialties Details Why Contact Info    Tigist Pang MD [address] [place] [address] [place] [address]  [phone]            Discharge Medication List as of 8/24/2023  8:21 PM      START taking these medications    Details   acetaminophen (TYLENOL) 500 mg tablet Take 1 tablet (500 mg total) by mouth every 6 (six) hours as needed for mild pain or moderate pain, Starting Thu 8/24/2023, Print      doxycycline hyclate (VIBRAMYCIN) 100 mg capsule Take 1 capsule (100 mg total) by mouth 2 (two) times a day for 7 days, Starting Thu 8/24/2023, Until Thu 8/31/2023, Normal      hydrochlorothiazide (HYDRODIURIL) 25 mg tablet Take 1 tablet (25 mg total) by mouth daily, Starting Thu 8/24/2023, Normal      ibuprofen (MOTRIN) 400 mg tablet Take 1 tablet (400 mg total) by mouth every 6 (six) hours as needed for mild pain or moderate pain, Starting Thu 8/24/2023, Print             No discharge procedures on file.     PDMP Review     None          ED Provider  Electronically Signed by           Louisa Koenig PA-C  08/25/23 [id number]

## 2023-10-09 ENCOUNTER — TRANSCRIBE ORDERS (OUTPATIENT)
Facility: HOSPITAL | Age: 53
End: 2023-10-09

## 2023-10-09 DIAGNOSIS — Z12.31 VISIT FOR SCREENING MAMMOGRAM: Primary | ICD-10-CM

## 2025-03-17 NOTE — ED NOTES
I have reviewed discharge information with patient. Patient verbalizes understanding. Patient ambulatory out of ER with steady gait. No distress noted.
NST Performed  
negative...